# Patient Record
Sex: FEMALE | Race: BLACK OR AFRICAN AMERICAN | Employment: UNEMPLOYED | URBAN - METROPOLITAN AREA
[De-identification: names, ages, dates, MRNs, and addresses within clinical notes are randomized per-mention and may not be internally consistent; named-entity substitution may affect disease eponyms.]

---

## 2016-06-09 LAB
CHOLEST SERPL-MCNC: 186 MG/DL (ref 0–200)
CREAT SERPL-MCNC: 0.77 MG/DL (ref 0.6–1.3)
GFR SERPL CREATININE-BSD FRML MDRD: 81.1 ML/MIN/1.73M2
GLUCOSE SERPL-MCNC: 77 MG/DL (ref 74–106)
HDLC SERPL-MCNC: 63 MG/DL (ref 40–60)
LDLC SERPL CALC-MCNC: 113.2 MG/DL (ref 0–130)
PAP SMEAR - HIM PATIENT REPORTED: NORMAL
POTASSIUM SERPL-SCNC: 4 MMOL/L (ref 3.5–5.1)
TRIGL SERPL-MCNC: 49 MG/DL (ref 0–200)
TSH SERPL-ACNC: 0.79 MIU/ML (ref 0.36–3.74)

## 2017-06-14 ENCOUNTER — TRANSFERRED RECORDS (OUTPATIENT)
Dept: HEALTH INFORMATION MANAGEMENT | Facility: CLINIC | Age: 47
End: 2017-06-14

## 2017-06-14 LAB — TSH SERPL-ACNC: 0.96 MIU/ML (ref 0.36–3.74)

## 2018-11-28 ENCOUNTER — TELEPHONE (OUTPATIENT)
Dept: ENDOCRINOLOGY | Facility: CLINIC | Age: 48
End: 2018-11-28

## 2018-11-28 NOTE — TELEPHONE ENCOUNTER
PREVISIT INFORMATION                                                    Linettedonovan Marsh Leonor scheduled for future visit at [facility] specialty clinics.    Patient is scheduled to see Dr. Leighann Del Rio  on 12/5/18  Reason for visit: Goiter  Referring provider Dr. Atilio Patel  Has patient seen previous specialist? No  Medical Records:  Patient will call [facility] and have records faxed. Patient states she had a biopsy done about 10 years or more ago but is not sure where, possibly [facility].    REVIEW                                                      New patient packet mailed to patient: N/A  Medication reconciliation complete: No - patient will bring list with her.      No current outpatient prescriptions on file.       Allergies: Review of patient's allergies indicates not on file.    PLAN/FOLLOW-UP NEEDED                                                      Previsit review complete.  Patient will see provider at future scheduled appointment.     Patient Reminders Given:  Please, make sure you bring an updated list of your medications.   If you are having a procedure, please, present 15 minutes early.  If you need to cancel or reschedule,please call [phone].    Jazmyn Kelley CMA  Adult Endocrinology  [facility]

## 2018-12-04 ENCOUNTER — TELEPHONE (OUTPATIENT)
Dept: ENDOCRINOLOGY | Facility: CLINIC | Age: 48
End: 2018-12-04

## 2018-12-04 NOTE — TELEPHONE ENCOUNTER
Contacted pt. To let her know that no records have been received yet.  She will contact previous provider and have them faxed directly to the pod.  Will leave encounter open and call pt before the end of the day if records do not arrive.  Tiffany Meneses CMA

## 2018-12-04 NOTE — TELEPHONE ENCOUNTER
[facility] Call Center    Phone Message    May a detailed message be left on voicemail: yes    Reason for Call: Other: Pt asking if her records were rec'd yet from Dr Patel for her appt tomorrow?       Action Taken: Message routed to:  Adult Clinics: Endocrinology p [phone]

## 2018-12-05 ENCOUNTER — OFFICE VISIT (OUTPATIENT)
Dept: ENDOCRINOLOGY | Facility: CLINIC | Age: 48
End: 2018-12-05
Payer: COMMERCIAL

## 2018-12-05 VITALS
WEIGHT: 224.8 LBS | SYSTOLIC BLOOD PRESSURE: 131 MMHG | DIASTOLIC BLOOD PRESSURE: 91 MMHG | OXYGEN SATURATION: 97 % | HEIGHT: 64 IN | HEART RATE: 91 BPM | BODY MASS INDEX: 38.38 KG/M2

## 2018-12-05 DIAGNOSIS — E04.1 RIGHT-SIDED UNINODULAR GOITER: Primary | ICD-10-CM

## 2018-12-05 LAB
T4 FREE SERPL-MCNC: 0.87 NG/DL (ref 0.76–1.46)
TSH SERPL DL<=0.005 MIU/L-ACNC: 0.5 MU/L (ref 0.4–4)

## 2018-12-05 PROCEDURE — 84443 ASSAY THYROID STIM HORMONE: CPT | Performed by: INTERNAL MEDICINE

## 2018-12-05 PROCEDURE — 36415 COLL VENOUS BLD VENIPUNCTURE: CPT | Performed by: INTERNAL MEDICINE

## 2018-12-05 PROCEDURE — 84439 ASSAY OF FREE THYROXINE: CPT | Performed by: INTERNAL MEDICINE

## 2018-12-05 PROCEDURE — 99244 OFF/OP CNSLTJ NEW/EST MOD 40: CPT | Performed by: INTERNAL MEDICINE

## 2018-12-05 NOTE — LETTER
12/5/2018         RE: Marcelo Galvez  [address] [place] [address]        Dear Colleague,    Thank you for referring your patient, Marcelo Galvez, to the [facility]. Please see a copy of my visit note below.      The patient is seen in consultation at the request of Dr. Atilio Patel for evaluation of a thyroid goiter.     Marcelo Galvez is a 48 year old female with a nonsignificant past medical history, who noticed a enlargement after her last delivery, in 2008.  The patient remembers having a right-sided thyroid biopsy done in 2008 (not sure of the place), which was benign.  She thinks she might have seen an endocrinologist after the biopsy, but she is not sure.  Follow-up ultrasounds were done in 2012 and 2017, at [facility].  Unfortunately, the ultrasound images were not available for my review at the time of the visit.  The prior reports documented following findings:  US 2012   RIGHT LOBE: Overall the right lobe measures  4.8 cm X 5.0 cm X 6.9 cm.  The right lobe is diffusely inhomogeneous in echotexture.    LEFT LOBE:  Overall the left lobe measures  1.0 cm X 1.4 cm X 3.9 cm.   No cystic or solid masses seen.  US 6/2017   RIGHT LOBE: Overall the right lobe measures  2.3 cm X 5.5 cm X 9.3 cm. The cephalocaudal dimension appears to be better visualized on the current examination.  Diffuse inhomogeneous echotexture, unchanged.    LEFT LOBE:  Overall the left lobe measures  1.0 cm X 1.2 cm X 4.1 cm.  No cystic or solid masses seen.  The patient denies voice hoarseness, dysphagia, cough, dizziness.  With sudden neck movements, she is aware of the right thyroid enlargement.  She denies a family history of thyroid goiter or cancer.  She has no prior history of radiation exposure.    I reviewed outside lab results:  6/10/16  TSH 0.79  Free T4 0.87  Triglycerides 49  HDL 63    Glucose 77, normal BMP     14/17  TSH 0.96  Free T4 0.85    Past Medical History  "  Breast biopsies   Thyroid goiter    Past Surgical History    x1  [place] teeth surgery    Current Medications  Prescription Medications as of 2018             Multiple Vitamin (MULTI VITAMIN DAILY PO) Take 1 tablet by mouth        Family History   Distant aunt had HTN. Father had pancreatic cancer. Grandmother - breast cancer in her 70s.     Social History  . She has 3 children. She denies smoking, drinking alcohol or using illicit drugs. Occupation: stay at home mom.     Review of Systems   Systemic:             No fatigue; weight stable   Eye:                      Wears cheaters   Mustapha-Laryngeal:     As above     Breast:                  No breast symptoms  Cardiovascular:    No cardiovascular symptoms, no CP or palpitations   Pulmonary:           No pulmonary symptoms, no SOB or cough    Gastrointestinal:   No gastrointestinal symptoms, no diarrhea or constipation   Genitourinary:       No genitourinary symptoms, no increased thirst or urination   Endocrine:            Last MP was in 2018; she has been experiencing hot flashes   Neurological:        Headaches present once every few months, no tremor, intermittent numbness sensation in her fingers - worsen during the last 2 months, no dizziness   Musculoskeletal:  No musculoskeletal symptoms, no muscle or joint pain   Skin:                     No skin symptoms, no dry skin, no hair falling out   Psychological:     No psychological symptoms                 Vital Signs     Previous Weights:    Wt Readings from Last 10 Encounters:   18 102 kg (224 lb 12.8 oz)        BP (!) 131/91 (BP Location: Left arm, Patient Position: Sitting, Cuff Size: Adult Large)  Pulse 91  Ht 1.624 m (5' 3.94\")  Wt 102 kg (224 lb 12.8 oz)  SpO2 97%  BMI 38.66 kg/m2    Physical Exam  General Appearance: General obesity, no distress noted  Eyes:  conjutivae and extra-ocular motions are normal.                                    pupils round and reactive to " light, no lid lag, no stare    HEENT:   oropharynx clear and moist, no JVD, no bruits     Palpable and visible right thyroid lobe enlargement, measuring 9 cm in the longitudinal diameter.  The left thyroid lobe appears normal in size.  Cardiovascular:  regular rhythm, no murmurs, distal pulse palpable, no edema  Respiratory:        chest clear, no rales, no rhonchi   Gastrointestinal:  abdomen soft, non-tender, non-distended, normal bowel sounds,    no organomegaly  Musculoskeletal:  normal tone and strength  Psychological:          affect and judgment normal  Skin:  warm, no lesions  Neurological:  reflexes normal and symmetric, no resting tremor.     Assessment     Right-sided thyroid goiter, nontoxic. She has no risk factors for thyroid cancer.      Clinically, she is euthyroid.  We are going to recheck the thyroid hormone levels today, to confirm the euthyroid status.    Considering the size and the patient's age, I recommended thyroidectomy.  Long-term, she has a high risk of developing compressive symptoms related to the right thyroid enlargement.  There is a 30-40% chance for the patient to develop hypothyroidism, post surgery.  Counseled on surgery, complications associated with surgery.    Plan:  Obtain outside ultrasound images from 2017.  Depending on the ultrasound appearance of the thyroid gland, we might consider pursuing a thyroid biopsy.  Schedule a neck CT, to better evaluate the relationship between the thyroid gland and surrounding structures  Check TFTs today  Surgery consult     Orders Placed This Encounter   Procedures     CT Soft Tissue Neck w/o Contrast     TSH     T4 free     Surgery General IP Consult                             Again, thank you for allowing me to participate in the care of your patient.        Sincerely,        Leighann Del Rio MD

## 2018-12-05 NOTE — LETTER
Patient:  Marcelo Galvez  :   1970  MRN:     [id number]        Ms.Mawunge Maximo Galvez  [address]  [place] [address]        2018    Dear ,    These are the most recent lab results. The thyroid hormone levels are normal.     Resulted Orders   TSH   Result Value Ref Range    TSH 0.50 0.40 - 4.00 mU/L   T4 free   Result Value Ref Range    T4 Free 0.87 0.76 - 1.46 ng/dL       It was a pleasure to see you at your recent visit. Please let me know if you have any questions or concerns.     Sincerely,    Leighann Del Rio MD

## 2018-12-05 NOTE — PROGRESS NOTES
The patient is seen in consultation at the request of Dr. Atilio Patel for evaluation of a thyroid goiter.     Marcelo Galvez is a 48 year old female with a nonsignificant past medical history, who noticed a enlargement after her last delivery, in .  The patient remembers having a right-sided thyroid biopsy done in  (not sure of the place), which was benign.  She thinks she might have seen an endocrinologist after the biopsy, but she is not sure.  Follow-up ultrasounds were done in  and , at [facility].  Unfortunately, the ultrasound images were not available for my review at the time of the visit.  The prior reports documented following findings:  US    RIGHT LOBE: Overall the right lobe measures  4.8 cm X 5.0 cm X 6.9 cm.  The right lobe is diffusely inhomogeneous in echotexture.    LEFT LOBE:  Overall the left lobe measures  1.0 cm X 1.4 cm X 3.9 cm.   No cystic or solid masses seen.  US 2017   RIGHT LOBE: Overall the right lobe measures  2.3 cm X 5.5 cm X 9.3 cm. The cephalocaudal dimension appears to be better visualized on the current examination.  Diffuse inhomogeneous echotexture, unchanged.    LEFT LOBE:  Overall the left lobe measures  1.0 cm X 1.2 cm X 4.1 cm.  No cystic or solid masses seen.  The patient denies voice hoarseness, dysphagia, cough, dizziness.  With sudden neck movements, she is aware of the right thyroid enlargement.  She denies a family history of thyroid goiter or cancer.  She has no prior history of radiation exposure.    I reviewed outside lab results:  6/10/16  TSH 0.79  Free T4 0.87  Triglycerides 49  HDL 63    Glucose 77, normal BMP       TSH 0.96  Free T4 0.85    Past Medical History   Breast biopsies   Thyroid goiter    Past Surgical History    x1  [place] teeth surgery    Current Medications  Prescription Medications as of 2018             Multiple Vitamin (MULTI VITAMIN DAILY PO) Take 1 tablet by mouth        Family  "History   Distant aunt had HTN. Father had pancreatic cancer. Grandmother - breast cancer in her 70s.     Social History  . She has 3 children. She denies smoking, drinking alcohol or using illicit drugs. Occupation: stay at home mom.     Review of Systems   Systemic:             No fatigue; weight stable   Eye:                      Wears cheaters   Mustapha-Laryngeal:     As above     Breast:                  No breast symptoms  Cardiovascular:    No cardiovascular symptoms, no CP or palpitations   Pulmonary:           No pulmonary symptoms, no SOB or cough    Gastrointestinal:   No gastrointestinal symptoms, no diarrhea or constipation   Genitourinary:       No genitourinary symptoms, no increased thirst or urination   Endocrine:            Last MP was in 1/2018; she has been experiencing hot flashes   Neurological:        Headaches present once every few months, no tremor, intermittent numbness sensation in her fingers - worsen during the last 2 months, no dizziness   Musculoskeletal:  No musculoskeletal symptoms, no muscle or joint pain   Skin:                     No skin symptoms, no dry skin, no hair falling out   Psychological:     No psychological symptoms                 Vital Signs     Previous Weights:    Wt Readings from Last 10 Encounters:   12/05/18 102 kg (224 lb 12.8 oz)        BP (!) 131/91 (BP Location: Left arm, Patient Position: Sitting, Cuff Size: Adult Large)  Pulse 91  Ht 1.624 m (5' 3.94\")  Wt 102 kg (224 lb 12.8 oz)  SpO2 97%  BMI 38.66 kg/m2    Physical Exam  General Appearance: General obesity, no distress noted  Eyes:  conjutivae and extra-ocular motions are normal.                                    pupils round and reactive to light, no lid lag, no stare    HEENT:   oropharynx clear and moist, no JVD, no bruits     Palpable and visible right thyroid lobe enlargement, measuring 9 cm in the longitudinal diameter.  The left thyroid lobe appears normal in size.  Cardiovascular:  " regular rhythm, no murmurs, distal pulse palpable, no edema  Respiratory:        chest clear, no rales, no rhonchi   Gastrointestinal:  abdomen soft, non-tender, non-distended, normal bowel sounds,    no organomegaly  Musculoskeletal:  normal tone and strength  Psychological:          affect and judgment normal  Skin:  warm, no lesions  Neurological:  reflexes normal and symmetric, no resting tremor.     Assessment     Right-sided thyroid goiter, nontoxic. She has no risk factors for thyroid cancer.      Clinically, she is euthyroid.  We are going to recheck the thyroid hormone levels today, to confirm the euthyroid status.    Considering the size and the patient's age, I recommended thyroidectomy.  Long-term, she has a high risk of developing compressive symptoms related to the right thyroid enlargement.  There is a 30-40% chance for the patient to develop hypothyroidism, post surgery.  Counseled on surgery, complications associated with surgery.    Plan:  Obtain outside ultrasound images from 2017.  Depending on the ultrasound appearance of the thyroid gland, we might consider pursuing a thyroid biopsy.  Schedule a neck CT, to better evaluate the relationship between the thyroid gland and surrounding structures  Check TFTs today  Surgery consult     Orders Placed This Encounter   Procedures     CT Soft Tissue Neck w/o Contrast     TSH     T4 free     Surgery General IP Consult

## 2018-12-05 NOTE — MR AVS SNAPSHOT
After Visit Summary   12/5/2018    Marcelo Galvez    MRN: [id number]           Patient Information     Date Of Birth          1970        Visit Information        Provider Department      12/5/2018 9:00 AM Leighann Del Rio MD [facility]        Today's Diagnoses     Right-sided uninodular goiter    -  1       Follow-ups after your visit        Additional Services     Surgery General IP Consult                 Follow-up notes from your care team     Return in about 6 months (around 6/5/2019) for labs today, consult schedule, CT to be scheduled.      Your next 10 appointments already scheduled     Dec 07, 2018 10:30 AM CST   CT SOFT TISSUE NECK W/O CONTRAST with [facility]   [facility]    [address] [place] [id number] [phone]           How do I prepare for my exam? (Food and drink instructions) No Food and Drink Restrictions.  How do I prepare for my exam? (Other instructions) You do not need to do anything special to prepare for this exam. For a sinus scan: Use your nose spray (nasal decongestant spray) as directed.  What should I wear: Please wear loose clothing, such as a sweat suit or jogging clothes. Avoid snaps, zippers and other metal. We may ask you to undress and put on a hospital gown.  How long does the exam take: Most scans take less than 20 minutes.  What should I bring: Please bring any scans or X-rays taken at other hospitals, if similar tests were done. Also bring a list of your medicines, including vitamins, minerals and over-the-counter drugs. It is safest to leave personal items at home.  Do I need a : No  is needed.  What do I need to tell my doctor? Be sure to tell your doctor: * If you have any allergies. * If there s any chance you are pregnant. * If you are breastfeeding.  What should I do after the exam: No restrictions, You may resume normal activities.  What is  this test: A CT (computed tomography) scan is a series of pictures that allows us to look inside your body. The scanner creates images of the body in cross sections, much like slices of bread. This helps us see any problems more clearly.  Who should I call with questions: If you have any questions, please call the Imaging Department where you will have your exam. Directions, parking instructions, and other information is available on our website, [email or web address].            Dec 21, 2018 12:00 PM CST   New Visit with Frances [facility]adeo MD   [facility]    [address] [place] [address]   [phone]            Jul 23, 2019 11:30 AM CDT   Return Visit with Leighann Del Rio MD   [facility]    [address] [place] [id number] [phone]              Future tests that were ordered for you today     Open Future Orders        Priority Expected Expires Ordered    CT Soft Tissue Neck w/o Contrast Routine  12/5/2019 12/5/2018    T4 free Routine 12/5/2018 12/5/2019 12/5/2018    TSH Routine 12/5/2018 12/5/2019 12/5/2018            Who to contact     If you have questions or need follow up information about today's clinic visit or your schedule please contact [facility] directly at [phone].  Normal or non-critical lab and imaging results will be communicated to you by MyChart, letter or phone within 4 business days after the clinic has received the results. If you do not hear from us within 7 days, please contact the clinic through MyChart or phone. If you have a critical or abnormal lab result, we will notify you by phone as soon as possible.  Submit refill requests through [facility] or call your pharmacy and they will forward the refill request to us. Please allow 3 business days for your refill to be completed.          Additional Information About Your Visit    "     MyChart Information     whistleBox is an electronic gateway that provides easy, online access to your medical records. With whistleBox, you can request a clinic appointment, read your test results, renew a prescription or communicate with your care team.     To sign up for [employer]Box visit the website at [email or web address]   You will be asked to enter the access code listed below, as well as some personal information. Please follow the directions to create your username and password.     Your access code is: [email or web address]  Expires: 3/5/2019  9:58 AM     Your access code will  in 90 days. If you need help or a new code, please contact your [facility] or call [phone] for assistance.        Care EveryWhere ID     This is your Care EveryWhere ID. This could be used by other organizations to access your [facility] medical records  [id number]        Your Vitals Were     Pulse Height Pulse Oximetry BMI (Body Mass Index)          91 1.624 m (5' 3.94\") 97% 38.66 kg/m2         Blood Pressure from Last 3 Encounters:   18 (!) 131/91    Weight from Last 3 Encounters:   18 102 kg (224 lb 12.8 oz)              We Performed the Following     Surgery General IP Consult        Primary Care Provider Office Phone # Fax #    Atilio Patel PA-C [phone]       [facility] [address] [facility] [address]  [facility] [address]        Equal Access to Services     BEV MOELLER : Hadii aad ku hadasho Soomaali, waaxda luqadaha, qaybta kaalmada adeegyada, henok love . So [facility] [phone].    ATENCIÓN: Si habla español, tiene a mays disposición servicios gratuitos de asistencia lingüística. Llame al [phone].    We comply with applicable federal civil rights laws and [place] laws. We do not discriminate on the basis of race, color, national origin, age, disability, sex, sexual orientation, or gender identity.            Thank you!     Thank " you for choosing [facility]  for your care. Our goal is always to provide you with excellent care. Hearing back from our patients is one way we can continue to improve our services. Please take a few minutes to complete the written survey that you may receive in the mail after your visit with us. Thank you!             Your Updated Medication List - Protect others around you: Learn how to safely use, store and throw away your medicines at www.disposemymeds.org.          This list is accurate as of 12/5/18  9:58 AM.  Always use your most recent med list.                   Brand Name Dispense Instructions for use Diagnosis    MULTI VITAMIN DAILY PO      Take 1 tablet by mouth

## 2018-12-21 ENCOUNTER — ANCILLARY PROCEDURE (OUTPATIENT)
Dept: CT IMAGING | Facility: CLINIC | Age: 48
End: 2018-12-21
Attending: INTERNAL MEDICINE
Payer: COMMERCIAL

## 2018-12-21 DIAGNOSIS — E04.1 RIGHT-SIDED UNINODULAR GOITER: ICD-10-CM

## 2018-12-21 PROCEDURE — 70491 CT SOFT TISSUE NECK W/DYE: CPT | Performed by: RADIOLOGY

## 2018-12-21 RX ORDER — IOPAMIDOL 755 MG/ML
100 INJECTION, SOLUTION INTRAVASCULAR ONCE
Status: COMPLETED | OUTPATIENT
Start: 2018-12-21 | End: 2018-12-21

## 2018-12-21 RX ADMIN — IOPAMIDOL 100 ML: 755 INJECTION, SOLUTION INTRAVASCULAR at 11:32

## 2018-12-22 NOTE — RESULT ENCOUNTER NOTE
On the neck CT, the right thyroid mass appears larger compared with the prior ultrasound images from 2017.  It impinges on a few anatomical structures, including the airway, vessels.  My recommendation remains the same: To have it removed surgically.  Although the mass appears to be benign on imaging, I would recommend pursuing a biopsy of the mass prior to surgery.  In the event malignancy is discovered, you are going to benefit from having the left side of the thyroid removed, too.  If the biopsy is benign, just having the right side of the thyroid removed should suffice.  I know you are scheduled to see Dr. Tadeo soon.  Please discuss with her the option of pursuing a biopsy prior to surgery.

## 2018-12-24 ENCOUNTER — TELEPHONE (OUTPATIENT)
Dept: ENDOCRINOLOGY | Facility: CLINIC | Age: 48
End: 2018-12-24

## 2018-12-24 NOTE — TELEPHONE ENCOUNTER
Received result note from Dr. Del Rio as follows:  On the neck CT, the right thyroid mass appears larger compared with the prior ultrasound images from 2017.  It impinges on a few anatomical structures, including the airway, vessels.  My recommendation remains the same: To have it removed surgically.  Although the mass appears to be benign on imaging, I would recommend pursuing a biopsy of the mass prior to surgery.  In the event malignancy is discovered, you are going to benefit from having the left side of the thyroid removed, too.  If the biopsy is benign, just having the right side of the thyroid removed should suffice.  I know you are scheduled to see Dr. Tadeo soon. Please discuss with her the option of pursuing a biopsy prior to surgery.      Patient is scheduled to see Dr. Tadeo on 12/26/18. Contacted patient to review. Advised patient of Dr. Del Rio's recommendations. Patient verbalizes understanding and agrees to plan. Patient will discuss Dr. Tadeo on 12/26/18.     Dee Gaitan RN  Endocrine Care Coordinator  [facility]

## 2018-12-26 ENCOUNTER — OFFICE VISIT (OUTPATIENT)
Dept: SURGERY | Facility: CLINIC | Age: 48
End: 2018-12-26
Payer: COMMERCIAL

## 2018-12-26 VITALS
TEMPERATURE: 97.9 F | BODY MASS INDEX: 37.84 KG/M2 | OXYGEN SATURATION: 97 % | SYSTOLIC BLOOD PRESSURE: 129 MMHG | WEIGHT: 227.1 LBS | DIASTOLIC BLOOD PRESSURE: 86 MMHG | HEART RATE: 88 BPM | RESPIRATION RATE: 18 BRPM | HEIGHT: 65 IN

## 2018-12-26 DIAGNOSIS — E04.9 SUBSTERNAL THYROID GOITER: Primary | ICD-10-CM

## 2018-12-26 PROCEDURE — 99203 OFFICE O/P NEW LOW 30 MIN: CPT | Performed by: SURGERY

## 2018-12-26 ASSESSMENT — MIFFLIN-ST. JEOR: SCORE: 1662.03

## 2018-12-26 ASSESSMENT — PAIN SCALES - GENERAL: PAINLEVEL: SEVERE PAIN (7)

## 2018-12-26 NOTE — LETTER
12/26/2018         RE: Marcelo Galvez  [address] [place] [address]        Dear Colleague,    Thank you for referring your patient, Marcelo Galvez, to the [facility]. Please see a copy of my visit note below.    I spent > 30 minutes in the care and consultation of this patient for right thyroid nodule.       This is a 48 year old female who noticed a enlargement after her last delivery, in 2008.   A biopsy of this was done and noted to be benign at an outside hospital. She thinks she might have seen an endocrinologist after the biopsy, but she is not sure.  Follow-up ultrasounds were done in 2012 and 2017, at [facility].     US from 2012 reveals a 6.9 cm thyroid mass, 2017 nodure increased in size to 9.3 cm and recent CT done 12/21/2108 shows a 8.3 x 5.8. 5.1 cm right thyroid mass.    The patient denies voice hoarseness, dysphagia, cough, dizziness.  With sudden neck movements, she is aware of the right thyroid enlargement.  She denies a family history of thyroid goiter or cancer.  She has no prior history of radiation exposure.      PMH/PSH and medications reviewed in EMR    PE: Very large right thyroid mass extending almost 10 cm in size with tracheal deviation. No airway compromise. Left lobe barely palpable    TFT's are WNL    Asses:  Very large thyroid goiter -RIGHT    Plan:  I recommend right thyroid lobectomy. Patient will likely require a drain postoperatively and admitted to hospital due to size of mass. Discussed risks including but not limited to bleeding, infection, injury to the recurrent laryngeal nerve, loss of airway.         Again, thank you for allowing me to participate in the care of your patient.        Sincerely,        Frances Tadeo MD

## 2018-12-26 NOTE — PATIENT INSTRUCTIONS
Surgery Instructions    Always follow your surgeon s instructions. If you don t, your surgery could be cancelled. Please use the following checklist.  Your surgery is on: The surgery scheduler will contact you within 1 week of your consult with the surgeon. If you do not hear from them, please call the clinic or RN Care Coordinator for your provider.    Time: Prearrival times can vary depending on location/type of surgery.  [place] - 2 hour pre-arrival  [facility] [place] [place] 2 hour pre-arrival  [place] - 1 hour pre-arrival    Note:  These times may change. A nurse will call you before surgery to confirm. If you have not received a call or if you have more questions, please call us on the working day before your surgery:  ? [facility]: [phone] (9am to 5pm)  Prior to surgery  ? Have a pre-op physical exam with your Primary Doctor within 30 days of surgery  - Ask your doctor to send all of your results to the surgery center/hospital before surgery. Your doctor also may ask you to bring the results with you on the day of surgery.  - Tell your doctor if:  - You are allergic to latex or rubber (latex and rubber gloves are often used in medical care).  - You are taking any medicines (including aspirin), vitamins, or herbal products. You may need to stop taking some medicines before surgery.  - You have any medical problems (allergies, diabetes, or heart disease, for example).  - You have a pacemaker or an AICD (automatic implanted cardiac defibrillator). If you do, please bring the ID card with you on the day of surgery.  - People who smoke have a higher risk of infection after surgery. Ask your doctor how you can quit smoking.  - If you Primary Doctor is not within the [employer] system, you will need to have your pre-op physical faxed to us to be scanned into your chart.  - [facility] [place] [place] ([facility]): [phone]  ? Call your insurance company. Ask if you need pre-approval for your surgery. If you do not have  insurance, please let us know. If you wish to speak to the , please alert the clinic staff so this can be arranged.  ? Arrange for someone to drive you home after surgery.  will need to be a responsible adult (18 years or older) that will provide transportation to and from surgery and stay in the waiting room during your surgery. You may not drive yourself or take public transportation to and from surgery.  ? Arrange for someone to stay with you for 24 hours after you go home. This person must be a responsible adult (18 years or older).  ? Call your surgeon or their nurse if there is any change in your health (cold, flu, infections, hospitalizations).  ? Do not smoke, drink alcohol, or take over-the-counter medicine for 24 hours before and after surgery.  ? If you take prescribed drugs, you may need to stop them until after the surgery.  Discuss what medications to take or not take prior to surgery with your Primary Doctor at your pre-op physical. Avoid over-the-counter blood-thinning medications such as Aspirin, Ibuprofen, vitamin E, or fish oil 7 days prior to surgery (unless otherwise directed by your Primary Doctor). Tylenol is a good alternative for mild pain relief prior to surgery.  ? Eating and drinking guidelines prior to surgery:  - Stop all solid food consumption 8 hours prior to surgery  - You may drink clear liquids up to 2 hours prior to surgery (water, fruit juices without pulp, jello, tea/coffee without creamer, sports drinks, clear-fat free broth (bouillon or consomme), popsicles (without milk, bits of fruit, or seeds/nuts)  ? Follow instructions given for showering or bathing before surgery.    - Use 8 ounces of antiseptic surgical soap, like:  - Hibiclens, Scrub Care, or Exidine  - You can find it at your local pharmacy, clinic, or retail store. If you have trouble, ask your pharmacist to help you find the right substitute.  - Please wash with one of the above soaps twice  before coming to the hospital for your surgery. This will decrease bacteria (germs) on your skin. It will also help reduce your chance of infection after surgery.  - Items you will need for showering:  - 4 newly washed washcloths  - 2 newly washed towels  - 8 ounces of one of the above soaps  - Following these instructions:  - The evening before surgery: Shower or bathe as you normally would, using your regular soap and a clean washcloth. Give special attention to places where your incision (surgical cut) or catheters will be. This includes your groin area. Rinse well. You may wash your hair with your regular shampoo. Next, wash your body with 4 ounces of the antiseptic soap. Use a clean, damp washcloth and gently clean your body (from the chin down). If your surgery involves your head, use the special soap on your head and scalp. Rinse well and dry off using a newly washed towel.  - The morning of surgery: Repeat the same process as the evening shower.  - Other suggestions:    Do not shave within 12 inches of your incision (surgical cut) area for at least 3 days before surgery. Shaving can make small cuts in the skin. This puts you at higher risk of infection.    Wear freshly washed pajamas or clothing after your evening shower.    Wear freshly washed clothes the day of surgery.    Wash and change your bed sheets the day before surgery to have clean bed sheets after your shower and when you get home from surgery.    If you have trouble washing all areas, make sure someone helps you.    Don't use any deodorant, lotion or powder after your shower.    Women who are menstruating should wear a fresh sanitary pad to the hospital.  ? Do not wear or add deodorant, cologne, lotion, makeup, nail polish or jewelry to surgery. If you wear fake nails, please remove at least one nail before coming to surgery (an oxygen monitor needs to be placed on your finger during surgery).  ? Bring these items to the surgery  center/hospital:  - Insurance card  - Money for parking and co-pays, if needed  - A list of all the medicines you take. Include vitamins, minerals, herbs, and over-the-counter drugs.  Note any drug allergies.  - A copy of your advance health care directive, if you have one. This tells us what treatment you would want--and who would make health care decisions--if you could no longer speak for yourself. You may request this form in advance or download it from [email or web address].  - A case for glasses, contact lenses, hearing aids, or dentures.  - Your inhaler or CPAP machine, if you use these at home.  ? Leave extra cash, jewelry, and other valuables at home.  When you arrive  When you get to the surgery center/hospital, you will:  ? Check in. If you are under age 18, you must be with a parent or legal guardian.  ? Sign consent forms, if you haven t already. These forms state that you know the risks and benefits of surgery. When you sign the forms, you give us permission to do the surgery. Do not sign them unless you understand what will happen during and after your surgery. If you have any questions about your surgery, ask to speak with your doctor before you sign the forms. If you don t understand the answers, ask again.  ? Receive a copy of the Patient s Bill of Rights. If you do not receive a copy, please ask for one.  ? Change into hospital clothes. Your belongings will be placed in a bag. We will return them to you after surgery.  ? Meet with the anesthesia provider. He or she will tell you what kind of anesthesia (medicine) will be used to keep you comfortable during surgery.  Remember: it s okay to remind doctors and nurses to wash their hands before touching you.  In most cases, your surgeon will use a marker to write his or her initials on the surgery site. This ensures that the exact site is operated on.  For safety reasons, we will ask you the same questions many times. For example, we may ask your  name and birth date over and over again.  Friends and family can stay with you until it s time for surgery. While you re in surgery, they will be in the waiting area. Please note that cell phones are not allowed in some patient care areas.  If you have questions about what will happen in the operating room, talk to your care team.  After surgery  We will move you to a recovery room, where we will watch you closely. If you have any pain or discomfort, tell your nurse. He or she will try to make you comfortable.  If you are staying overnight, we will move you to your hospital room after you are awake.  If you are going home, we will move you to another room. Friends and family may be able to join you. The length of time you spend in recovery depend on the type of medicine you received, your medical condition, the type of surgery you had, or your response to the anesthesia given during your procedure.  When you are discharged from the recovery room, the nurses will review instructions with you and your caregiver.  ? Please wash your hands every time you touch the wound or change bandages or dressings.  ? Do not submerge the wound in water.  You may not use a bathtub or hot tub until the wound is closed. The wait time frame is generally 2-3 weeks, but any open area can be a source of incoming bacteria, so it is better to be on the safe side and avoid water submersion until your wound is fully healed.  ? You may take a shower 24 hours after surgery. Double check with your surgeon if it is OK for water to run over the wound, whether it has been sutured, stapled, glued, or is open. You may gently wash the wound using the antiseptic soap provided for your pre-surgery showering (do not use a washcloth). Any mild soap will work as well.  ? Many surgical wounds will have small white strips of tape on them called steri-strips.  Do not remove these. The edges will curl and fall off within 7-10 days with normal showering.  ? If  you are going home with sutures (stitches) or staples, you must return to the clinic to have them taken out, usually within 1-2 weeks. Some stitches are dissolvable and do not require removal. Make sure to clarify with your surgeon or surgery nurse reviewing discharge paperwork what kind of sutures you have.  ? Signs and symptoms of infection include:  - Fever, temperature over 101.5   F  - Redness  - Swelling  - Increased pain  - Green or yellow drainage which may or may not have a foul odor  Dealing with pain  A nurse will check your comfort level often during your stay. He or she will work with you to manage your pain.  Remember:  ? All pain is real. There are many ways to control pain. We can help you decide what works best for you.  ? Ask for pain medicine when you need it. Don t try to  tough it out --this can make you feel worse. Always take your medicine as ordered.  ? Medicine doesn t work the same for everyone. If your medicine isn t working, tell your nurse. There may be other medicines or treatments we can try.  Going home  We will let you know when you re ready to leave the surgery center or hospital. Before you leave, we will tell you how to care for yourself at home and prevent infections. If you do not understand something, please say so. We will answer any questions you have. We will then help you get ready to leave.  Remember, you must have a responsible adult (18 years or older) to stay with you 24 hours after you leave the hospital.  Take it easy when you get home. You will need some time to recover--you may be more tired than you realize at first. Rest and relax for at least the first 24 hours at home. You ll feel better and heal faster if you take good care of yourself.  Follow the discharge instructions that are given to you when you leave the surgery center or hospital  Please call the clinic if you experience any problems during regular clinic hours (Monday-Friday 8:00am-5:00pm).  If you  experience problems during non-clinic hours, please call the [facility] on-call line at [phone] and ask the  to page the on-call Provider for your specialty. The on-call Provider will call you back and can triage your symptoms and further advise. If you are having an emergency, always call 911 or seek immediate evaluation at the Emergency Room.  Locations  [facility], [address] [address]  [phone] (patient registration)  [phone] (main line)  [email or web address]

## 2018-12-26 NOTE — NURSING NOTE
"Marcelo Galvez's goals for this visit include:   Chief Complaint   Patient presents with     Consult      Consult Goiter - per Dr. Valdez       She requests these members of her care team be copied on today's visit information: Yes    PCP: Atilio Patel    Referring Provider:  No referring provider defined for this encounter.    /86 (BP Location: Left arm, Patient Position: Sitting, Cuff Size: Adult Large)   Pulse 88   Temp 97.9  F (36.6  C) (Oral)   Resp 18   Ht 1.645 m (5' 4.75\")   Wt 103 kg (227 lb 1.6 oz)   SpO2 97%   BMI 38.08 kg/m      Do you need any medication refills at today's visit? No    Kevin Lau CMA ([facility])      "

## 2019-01-27 NOTE — PROGRESS NOTES
I spent > 30 minutes in the care and consultation of this patient for right thyroid nodule.       This is a 48 year old female who noticed a enlargement after her last delivery, in 2008.  A biopsy of this was done and noted to be benign at an outside hospital. She thinks she might have seen an endocrinologist after the biopsy, but she is not sure.  Follow-up ultrasounds were done in 2012 and 2017, at [facility].     US from 2012 reveals a 6.9 cm thyroid mass, 2017 nodure increased in size to 9.3 cm and recent CT done 12/21/2108 shows a 8.3 x 5.8. 5.1 cm right thyroid mass.    The patient denies voice hoarseness, dysphagia, cough, dizziness.  With sudden neck movements, she is aware of the right thyroid enlargement.  She denies a family history of thyroid goiter or cancer.  She has no prior history of radiation exposure.      PMH/PSH and medications reviewed in EMR    PE: Very large right thyroid mass extending almost 10 cm in size with tracheal deviation. No airway compromise. Left lobe barely palpable    TFT's are WNL    Asses:  Very large thyroid goiter -RIGHT    Plan:  I recommend right thyroid lobectomy. Patient will likely require a drain postoperatively and admitted to hospital due to size of mass. Discussed risks including but not limited to bleeding, infection, injury to the recurrent laryngeal nerve, loss of airway.

## 2019-03-11 ENCOUNTER — DOCUMENTATION ONLY (OUTPATIENT)
Dept: SURGERY | Facility: CLINIC | Age: 49
End: 2019-03-11

## 2019-03-11 NOTE — PROGRESS NOTES
Call received from Amanda in the [facility] (Pre-op anthesia nurses) stating that patient wished to reschedule her surgery with Dr Tadeo    I called patient and the following are the changes that we did:    OLD Surgery date: 3/13/19    NEW surgery date 5/22/19      Post op date change:    Old: 4/5/19  New: 6/7/19      Surgery scheduling at [facility] contacted and surgery date was rescheduled    Jeniffer Murillo   [facility]-Op Coordinator  [phone]

## 2019-05-21 ENCOUNTER — ANESTHESIA EVENT (OUTPATIENT)
Dept: SURGERY | Facility: CLINIC | Age: 49
End: 2019-05-21
Payer: COMMERCIAL

## 2019-05-22 ENCOUNTER — ANESTHESIA (OUTPATIENT)
Dept: SURGERY | Facility: CLINIC | Age: 49
End: 2019-05-22
Payer: COMMERCIAL

## 2019-05-22 ENCOUNTER — HOSPITAL ENCOUNTER (OUTPATIENT)
Facility: CLINIC | Age: 49
Setting detail: OBSERVATION
Discharge: HOME | End: 2019-05-24
Attending: SURGERY | Admitting: SURGERY
Payer: COMMERCIAL

## 2019-05-22 DIAGNOSIS — E04.1 THYROID NODULE: Primary | ICD-10-CM

## 2019-05-22 LAB
GLUCOSE BLDC GLUCOMTR-MCNC: 90 MG/DL (ref 70–99)
HCG UR QL: NEGATIVE
HGB BLD-MCNC: 12.6 G/DL (ref 11.7–15.7)

## 2019-05-22 PROCEDURE — 40000170 ZZH STATISTIC PRE-PROCEDURE ASSESSMENT II: Performed by: SURGERY

## 2019-05-22 PROCEDURE — 25000125 ZZHC RX 250: Performed by: NURSE ANESTHETIST, CERTIFIED REGISTERED

## 2019-05-22 PROCEDURE — 12000001 ZZH R&B MED SURG/OB UMMC

## 2019-05-22 PROCEDURE — 25000128 H RX IP 250 OP 636: Performed by: STUDENT IN AN ORGANIZED HEALTH CARE EDUCATION/TRAINING PROGRAM

## 2019-05-22 PROCEDURE — 37000009 ZZH ANESTHESIA TECHNICAL FEE, EACH ADDTL 15 MIN: Performed by: SURGERY

## 2019-05-22 PROCEDURE — 36000062 ZZH SURGERY LEVEL 4 1ST 30 MIN - UMMC: Performed by: SURGERY

## 2019-05-22 PROCEDURE — 25800030 ZZH RX IP 258 OP 636: Performed by: NURSE ANESTHETIST, CERTIFIED REGISTERED

## 2019-05-22 PROCEDURE — 25000128 H RX IP 250 OP 636: Performed by: NURSE ANESTHETIST, CERTIFIED REGISTERED

## 2019-05-22 PROCEDURE — 36000064 ZZH SURGERY LEVEL 4 EA 15 ADDTL MIN - UMMC: Performed by: SURGERY

## 2019-05-22 PROCEDURE — 00000146 ZZHCL STATISTIC GLUCOSE BY METER IP

## 2019-05-22 PROCEDURE — 37000008 ZZH ANESTHESIA TECHNICAL FEE, 1ST 30 MIN: Performed by: SURGERY

## 2019-05-22 PROCEDURE — 36415 COLL VENOUS BLD VENIPUNCTURE: CPT | Performed by: ANESTHESIOLOGY

## 2019-05-22 PROCEDURE — 81025 URINE PREGNANCY TEST: CPT | Performed by: ANESTHESIOLOGY

## 2019-05-22 PROCEDURE — 85018 HEMOGLOBIN: CPT | Performed by: ANESTHESIOLOGY

## 2019-05-22 PROCEDURE — 71000014 ZZH RECOVERY PHASE 1 LEVEL 2 FIRST HR: Performed by: SURGERY

## 2019-05-22 RX ORDER — NALOXONE HYDROCHLORIDE 0.4 MG/ML
.1-.4 INJECTION, SOLUTION INTRAMUSCULAR; INTRAVENOUS; SUBCUTANEOUS
Status: DISCONTINUED | OUTPATIENT
Start: 2019-05-22 | End: 2019-05-24 | Stop reason: HOSPADM

## 2019-05-22 RX ORDER — FENTANYL CITRATE 50 UG/ML
INJECTION, SOLUTION INTRAMUSCULAR; INTRAVENOUS PRN
Status: DISCONTINUED | OUTPATIENT
Start: 2019-05-22 | End: 2019-05-22

## 2019-05-22 RX ORDER — GLYCOPYRROLATE 0.2 MG/ML
INJECTION, SOLUTION INTRAMUSCULAR; INTRAVENOUS PRN
Status: DISCONTINUED | OUTPATIENT
Start: 2019-05-22 | End: 2019-05-22

## 2019-05-22 RX ORDER — ACETAMINOPHEN 325 MG/1
650 TABLET ORAL EVERY 4 HOURS PRN
Status: DISCONTINUED | OUTPATIENT
Start: 2019-05-22 | End: 2019-05-22

## 2019-05-22 RX ORDER — SODIUM CHLORIDE, SODIUM LACTATE, POTASSIUM CHLORIDE, CALCIUM CHLORIDE 600; 310; 30; 20 MG/100ML; MG/100ML; MG/100ML; MG/100ML
INJECTION, SOLUTION INTRAVENOUS CONTINUOUS
Status: DISCONTINUED | OUTPATIENT
Start: 2019-05-22 | End: 2019-05-22 | Stop reason: HOSPADM

## 2019-05-22 RX ORDER — PROPOFOL 10 MG/ML
INJECTION, EMULSION INTRAVENOUS PRN
Status: DISCONTINUED | OUTPATIENT
Start: 2019-05-22 | End: 2019-05-22

## 2019-05-22 RX ORDER — HYDROMORPHONE HYDROCHLORIDE 1 MG/ML
.3-.5 INJECTION, SOLUTION INTRAMUSCULAR; INTRAVENOUS; SUBCUTANEOUS EVERY 10 MIN PRN
Status: DISCONTINUED | OUTPATIENT
Start: 2019-05-22 | End: 2019-05-22 | Stop reason: HOSPADM

## 2019-05-22 RX ORDER — ONDANSETRON 2 MG/ML
4 INJECTION INTRAMUSCULAR; INTRAVENOUS EVERY 30 MIN PRN
Status: DISCONTINUED | OUTPATIENT
Start: 2019-05-22 | End: 2019-05-22 | Stop reason: HOSPADM

## 2019-05-22 RX ORDER — DEXAMETHASONE SODIUM PHOSPHATE 4 MG/ML
INJECTION, SOLUTION INTRA-ARTICULAR; INTRALESIONAL; INTRAMUSCULAR; INTRAVENOUS; SOFT TISSUE PRN
Status: DISCONTINUED | OUTPATIENT
Start: 2019-05-22 | End: 2019-05-22

## 2019-05-22 RX ORDER — FENTANYL CITRATE 50 UG/ML
25-50 INJECTION, SOLUTION INTRAMUSCULAR; INTRAVENOUS
Status: DISCONTINUED | OUTPATIENT
Start: 2019-05-22 | End: 2019-05-22 | Stop reason: HOSPADM

## 2019-05-22 RX ORDER — ACETAMINOPHEN 325 MG/1
975 TABLET ORAL ONCE
Status: DISCONTINUED | OUTPATIENT
Start: 2019-05-22 | End: 2019-05-22 | Stop reason: HOSPADM

## 2019-05-22 RX ORDER — LIDOCAINE 40 MG/G
CREAM TOPICAL
Status: DISCONTINUED | OUTPATIENT
Start: 2019-05-22 | End: 2019-05-24 | Stop reason: HOSPADM

## 2019-05-22 RX ORDER — NALOXONE HYDROCHLORIDE 0.4 MG/ML
.1-.4 INJECTION, SOLUTION INTRAMUSCULAR; INTRAVENOUS; SUBCUTANEOUS
Status: DISCONTINUED | OUTPATIENT
Start: 2019-05-22 | End: 2019-05-22 | Stop reason: HOSPADM

## 2019-05-22 RX ORDER — OXYCODONE HCL 5 MG/5 ML
5 SOLUTION, ORAL ORAL EVERY 4 HOURS PRN
Status: DISCONTINUED | OUTPATIENT
Start: 2019-05-22 | End: 2019-05-24 | Stop reason: HOSPADM

## 2019-05-22 RX ORDER — DEXAMETHASONE SODIUM PHOSPHATE 4 MG/ML
6 INJECTION, SOLUTION INTRA-ARTICULAR; INTRALESIONAL; INTRAMUSCULAR; INTRAVENOUS; SOFT TISSUE EVERY 6 HOURS
Status: DISCONTINUED | OUTPATIENT
Start: 2019-05-22 | End: 2019-05-24

## 2019-05-22 RX ORDER — LIDOCAINE 40 MG/G
CREAM TOPICAL
Status: DISCONTINUED | OUTPATIENT
Start: 2019-05-22 | End: 2019-05-22 | Stop reason: HOSPADM

## 2019-05-22 RX ORDER — SODIUM CHLORIDE, SODIUM LACTATE, POTASSIUM CHLORIDE, CALCIUM CHLORIDE 600; 310; 30; 20 MG/100ML; MG/100ML; MG/100ML; MG/100ML
INJECTION, SOLUTION INTRAVENOUS CONTINUOUS
Status: DISCONTINUED | OUTPATIENT
Start: 2019-05-22 | End: 2019-05-24

## 2019-05-22 RX ORDER — ONDANSETRON 4 MG/1
4 TABLET, ORALLY DISINTEGRATING ORAL EVERY 30 MIN PRN
Status: DISCONTINUED | OUTPATIENT
Start: 2019-05-22 | End: 2019-05-22 | Stop reason: HOSPADM

## 2019-05-22 RX ORDER — SODIUM CHLORIDE, SODIUM LACTATE, POTASSIUM CHLORIDE, CALCIUM CHLORIDE 600; 310; 30; 20 MG/100ML; MG/100ML; MG/100ML; MG/100ML
INJECTION, SOLUTION INTRAVENOUS CONTINUOUS PRN
Status: DISCONTINUED | OUTPATIENT
Start: 2019-05-22 | End: 2019-05-22

## 2019-05-22 RX ORDER — LIDOCAINE HYDROCHLORIDE 20 MG/ML
INJECTION, SOLUTION INFILTRATION; PERINEURAL PRN
Status: DISCONTINUED | OUTPATIENT
Start: 2019-05-22 | End: 2019-05-22

## 2019-05-22 RX ORDER — LABETALOL 20 MG/4 ML (5 MG/ML) INTRAVENOUS SYRINGE
10 EVERY 6 HOURS PRN
Status: DISCONTINUED | OUTPATIENT
Start: 2019-05-22 | End: 2019-05-24 | Stop reason: HOSPADM

## 2019-05-22 RX ADMIN — PROPOFOL 150 MG: 10 INJECTION, EMULSION INTRAVENOUS at 15:24

## 2019-05-22 RX ADMIN — Medication 100 MG: at 15:24

## 2019-05-22 RX ADMIN — FENTANYL CITRATE 50 MCG: 50 INJECTION, SOLUTION INTRAMUSCULAR; INTRAVENOUS at 15:24

## 2019-05-22 RX ADMIN — DEXAMETHASONE SODIUM PHOSPHATE 6 MG: 4 INJECTION, SOLUTION INTRAMUSCULAR; INTRAVENOUS at 22:22

## 2019-05-22 RX ADMIN — PROPOFOL 70 MG: 10 INJECTION, EMULSION INTRAVENOUS at 15:46

## 2019-05-22 RX ADMIN — MIDAZOLAM 2 MG: 1 INJECTION INTRAMUSCULAR; INTRAVENOUS at 15:11

## 2019-05-22 RX ADMIN — PROPOFOL 30 MG: 10 INJECTION, EMULSION INTRAVENOUS at 15:47

## 2019-05-22 RX ADMIN — SODIUM CHLORIDE, POTASSIUM CHLORIDE, SODIUM LACTATE AND CALCIUM CHLORIDE: 600; 310; 30; 20 INJECTION, SOLUTION INTRAVENOUS at 15:11

## 2019-05-22 RX ADMIN — DEXAMETHASONE SODIUM PHOSPHATE 6 MG: 4 INJECTION, SOLUTION INTRA-ARTICULAR; INTRALESIONAL; INTRAMUSCULAR; INTRAVENOUS; SOFT TISSUE at 15:50

## 2019-05-22 RX ADMIN — LIDOCAINE HYDROCHLORIDE 50 MG: 20 INJECTION, SOLUTION INFILTRATION; PERINEURAL at 15:24

## 2019-05-22 RX ADMIN — GLYCOPYRROLATE 0.2 MG: 0.2 INJECTION, SOLUTION INTRAMUSCULAR; INTRAVENOUS at 15:40

## 2019-05-22 ASSESSMENT — ACTIVITIES OF DAILY LIVING (ADL): ADLS_ACUITY_SCORE: 12

## 2019-05-22 ASSESSMENT — MIFFLIN-ST. JEOR: SCORE: 1657

## 2019-05-22 NOTE — ANESTHESIA CARE TRANSFER NOTE
Patient: Marcelo Galvez    Procedure(s):  Attempted Intubation, Unable to Intubate, Cancelled Case    Diagnosis: Substernal Thyroid Goiter  Diagnosis Additional Information: No value filed.    Anesthesia Type:   No value filed.     Note:  Airway :Face Mask  Patient transferred to:PACU  Comments: Pt awake, alert, responding appropriately. Stable, report to RN. Handoff Report: Identifed the Patient, Identified the Reponsible Provider, Reviewed the pertinent medical history, Discussed the surgical course, Reviewed Intra-OP anesthesia mangement and issues during anesthesia, Set expectations for post-procedure period and Allowed opportunity for questions and acknowledgement of understanding      Vitals: (Last set prior to Anesthesia Care Transfer)    CRNA VITALS  5/22/2019 1528 - 5/22/2019 1628      5/22/2019             EKG:  Sinus rhythm                Electronically Signed By: YUDELKA Carlton CRNA  May 22, 2019  4:42 PM

## 2019-05-22 NOTE — ANESTHESIA POSTPROCEDURE EVALUATION
Anesthesia POST Procedure Evaluation    Patient: Marcelo Galvez   MRN:     [id number] Gender:   female   Age:    49 year old :      1970        Preoperative Diagnosis: Substernal Thyroid Goiter   Procedure(s):  Attempted Intubation, Unable to Intubate, Cancelled Case   Postop Comments: No value filed.       Anesthesia Type:  General  No value filed.    Reportable Event: YES     PAIN: Uncomplicated   Sign Out status: Comfortable, Well controlled pain     PONV: No PONV   Sign Out status:  No Nausea or Vomiting     Neuro/Psych: Uneventful perioperative course   Sign Out Status: Preoperative baseline; Age appropriate mentation     Airway/Resp.: Uneventful perioperative course   Sign Out Status: Non labored breathing, age appropriate RR; Resp. Status within EXPECTED Parameters     CV: Uneventful perioperative course   Sign Out status: Appropriate BP and perfusion indices; Appropriate HR/Rhythm     Disposition:   Sign Out in:  PACU  Disposition:  Floor  Recovery Course: Uneventful  Follow-Up: Not required     Comments/Narrative:  Case aborted secondary to inability to intubate patient.  Patient received premedication with 2mg versed, standard induction dose of propofol and 100mg succinylcholine.  Difficult mask, however was able to ventilate with oral airway and two handed mask technique.  The presence of her large goiter resulted in significant tracheal displacement to the left.  Upon initial view with CMAC 3 blade, we were unable to view cords, grade 3 view.  Patient suctioned due to heavy secretions, bougie was procured and passed blindly.  ETT exchanged over and connected to circuit with no end tidal CO2.  ETT was removed, and second time mask ventilation proved more difficult, so LMA size 4 was inserted.  Patient began to spontaneously ventilate at this time, so LMA was removed.  After discussion with surgeon, it was agreed to attempt to take a second look with a different instrument.  Patient was given  0.2mg of glycopyrolate and 90mg propofol with glidescope present with a 4 blade.  We were unable to obtain a good view due to restriction of mouth opening as the anesthesiology and surgery teams were not comfortable re-paralyzing the patient in the setting in addition to the fact she had proven difficult to mask and now there may likely be additional airway edema/swelling.  Case was cancelled, patient began breathing spontaneously shortly after failed second look.  SpO2 was stable throughout.           Last Anesthesia Record Vitals:  CRNA VITALS  5/22/2019 1528 - 5/22/2019 1628      5/22/2019             EKG:  Sinus rhythm          Last PACU Vitals:  Vitals Value Taken Time   /106 5/22/2019  4:50 PM   Temp 36.9  C (98.4  F) 5/22/2019  4:30 PM   Pulse 95 5/22/2019  4:50 PM   Resp 24 5/22/2019  4:45 PM   SpO2 96 % 5/22/2019  4:51 PM   Temp src     NIBP     Pulse     SpO2     Resp     Temp     Ht Rate     Temp 2     Vitals shown include unvalidated device data.      Electronically Signed By: Jhony Sprague MD, May 22, 2019, 5:12 PM

## 2019-05-22 NOTE — BRIEF OP NOTE
[facility], [place]    Brief Operative Note    Pre-operative diagnosis: Substernal Thyroid Goiter  Post-operative diagnosis Same  Procedure: Procedure(s):  PROCEDURE ABORTED DUE TO FAILURE TO ACHIEVE AIRWAY  Surgeon: Surgeon(s) and Role:     * Frances Tadeo MD - Primary

## 2019-05-22 NOTE — ANESTHESIA PREPROCEDURE EVALUATION
Anesthesia Pre-Procedure Evaluation    Patient: Marcelo Galvez   MRN:     [id number] Gender:   female   Age:    49 year old :      1970        Preoperative Diagnosis: Substernal Thyroid Goiter   Procedure(s):  Right Thyroid Lobectomy     History reviewed. No pertinent past medical history.   Past Surgical History:   Procedure Laterality Date     GYN SURGERY                Anesthesia Evaluation     . Pt has had prior anesthetic.     No history of anesthetic complications          ROS/MED HX    ENT/Pulmonary:  - neg pulmonary ROS     Neurologic:  - neg neurologic ROS     Cardiovascular:  - neg cardiovascular ROS       METS/Exercise Tolerance:  >4 METS   Hematologic:  - neg hematologic  ROS       Musculoskeletal:         GI/Hepatic:  - neg GI/hepatic ROS       Renal/Genitourinary:  - ROS Renal section negative       Endo:     (+) thyroid problem (thyroid nodule) Obesity, .      Psychiatric:  - neg psychiatric ROS       Infectious Disease:         Malignancy:         Other:    (+) No chance of pregnancy no H/O Chronic Pain,                       PHYSICAL EXAM:   Mental Status/Neuro:    Airway: Facies: Feasible  Mallampati: II  Mouth/Opening: Full  TM distance: > 6 cm  Neck ROM: Full   Respiratory: Auscultation: CTAB     Resp. Rate: Normal     Resp. Effort: Normal      CV: Rhythm: Regular  Rate: Age appropriate  Heart: Normal Sounds   Comments:                    Lab Results   Component Value Date    HGB 12.6 2019    POTASSIUM 4.0 2016    CR 0.77 2016    GLC 77.0 2016    TSH 0.50 2018    T4 0.87 2018    HCG Negative 2019       Preop Vitals  BP Readings from Last 3 Encounters:   19 (!) 135/95   18 129/86   18 (!) 131/91    Pulse Readings from Last 3 Encounters:   19 87   18 88   18 91      Resp Readings from Last 3 Encounters:   19 18   18 18    SpO2 Readings from Last 3 Encounters:   19 98%   18 97%  "  12/05/18 97%      Temp Readings from Last 1 Encounters:   05/22/19 37.1  C (98.7  F) (Oral)    Ht Readings from Last 1 Encounters:   05/22/19 1.626 m (5' 4\")      Wt Readings from Last 1 Encounters:   05/22/19 104.2 kg (229 lb 11.5 oz)    Estimated body mass index is 39.43 kg/m  as calculated from the following:    Height as of this encounter: 1.626 m (5' 4\").    Weight as of this encounter: 104.2 kg (229 lb 11.5 oz).     LDA:  Peripheral IV 05/22/19 Left Hand (Active)   Site Assessment WDL 5/22/2019 12:57 PM   Line Status Saline locked 5/22/2019 12:57 PM   Phlebitis Scale 0-->no symptoms 5/22/2019 12:57 PM   Number of days: 0            Assessment:   ASA SCORE: 1    NPO Status: > 6 hours since completed [employer]   Documentation: H&P complete; Preop Testing complete; Consents complete   Proceeding: Proceed without further delay  Tobacco Use:  NO Active use of Tobacco/UNKNOWN Tobacco use status     Plan:   Anes. Type:  General   Pre-Induction: Midazolam IV; Acetaminophen PO   Induction:  IV (Standard)   Airway: Oral ETT   Access/Monitoring: PIV; 2nd PIV   Maintenance: Balanced   Emergence: Procedure Site   Logistics: Same Day Surgery     Postop Pain/Sedation Strategy:  Standard-Options: Opioids PRN     PONV Management:  Adult Risk Factors: Female, Non-Smoker, Postop Opioids  Prevention: Ondansetron; Dexamethasone     CONSENT: Direct conversation   Plan and risks discussed with: Patient   Blood Products: Consent Deferred (Minimal Blood Loss)       Comments for Plan/Consent:  49F with no PMH but thyroid nodule x several years here for resection.  ASA 1.  Plan: GETA, PIV x2.                         Stacey Vallejo MD  "

## 2019-05-23 ENCOUNTER — ANESTHESIA EVENT (OUTPATIENT)
Dept: SURGERY | Facility: CLINIC | Age: 49
End: 2019-05-23
Payer: COMMERCIAL

## 2019-05-23 ENCOUNTER — ANESTHESIA (OUTPATIENT)
Dept: SURGERY | Facility: CLINIC | Age: 49
End: 2019-05-23
Payer: COMMERCIAL

## 2019-05-23 PROBLEM — E89.0 S/P PARTIAL THYROIDECTOMY: Status: ACTIVE | Noted: 2019-05-23

## 2019-05-23 LAB — GLUCOSE BLDC GLUCOMTR-MCNC: 133 MG/DL (ref 70–99)

## 2019-05-23 PROCEDURE — 25000132 ZZH RX MED GY IP 250 OP 250 PS 637: Performed by: STUDENT IN AN ORGANIZED HEALTH CARE EDUCATION/TRAINING PROGRAM

## 2019-05-23 PROCEDURE — 71000015 ZZH RECOVERY PHASE 1 LEVEL 2 EA ADDTL HR: Performed by: SURGERY

## 2019-05-23 PROCEDURE — 25800030 ZZH RX IP 258 OP 636: Performed by: NURSE ANESTHETIST, CERTIFIED REGISTERED

## 2019-05-23 PROCEDURE — 37000008 ZZH ANESTHESIA TECHNICAL FEE, 1ST 30 MIN: Performed by: SURGERY

## 2019-05-23 PROCEDURE — 37000009 ZZH ANESTHESIA TECHNICAL FEE, EACH ADDTL 15 MIN: Performed by: SURGERY

## 2019-05-23 PROCEDURE — 25000128 H RX IP 250 OP 636: Performed by: NURSE ANESTHETIST, CERTIFIED REGISTERED

## 2019-05-23 PROCEDURE — 25800030 ZZH RX IP 258 OP 636: Performed by: STUDENT IN AN ORGANIZED HEALTH CARE EDUCATION/TRAINING PROGRAM

## 2019-05-23 PROCEDURE — 27210794 ZZH OR GENERAL SUPPLY STERILE: Performed by: SURGERY

## 2019-05-23 PROCEDURE — 88307 TISSUE EXAM BY PATHOLOGIST: CPT | Performed by: SURGERY

## 2019-05-23 PROCEDURE — 25000566 ZZH SEVOFLURANE, EA 15 MIN: Performed by: SURGERY

## 2019-05-23 PROCEDURE — 40000171 ZZH STATISTIC PRE-PROCEDURE ASSESSMENT III: Performed by: SURGERY

## 2019-05-23 PROCEDURE — 25000125 ZZHC RX 250: Performed by: NURSE ANESTHETIST, CERTIFIED REGISTERED

## 2019-05-23 PROCEDURE — 71000014 ZZH RECOVERY PHASE 1 LEVEL 2 FIRST HR: Performed by: SURGERY

## 2019-05-23 PROCEDURE — 27211024 ZZHC OR SUPPLY OTHER OPNP: Performed by: SURGERY

## 2019-05-23 PROCEDURE — 25000128 H RX IP 250 OP 636: Performed by: ANESTHESIOLOGY

## 2019-05-23 PROCEDURE — 00000146 ZZHCL STATISTIC GLUCOSE BY METER IP

## 2019-05-23 PROCEDURE — 36000062 ZZH SURGERY LEVEL 4 1ST 30 MIN - UMMC: Performed by: SURGERY

## 2019-05-23 PROCEDURE — G0378 HOSPITAL OBSERVATION PER HR: HCPCS

## 2019-05-23 PROCEDURE — 25000128 H RX IP 250 OP 636: Performed by: STUDENT IN AN ORGANIZED HEALTH CARE EDUCATION/TRAINING PROGRAM

## 2019-05-23 PROCEDURE — 36000064 ZZH SURGERY LEVEL 4 EA 15 ADDTL MIN - UMMC: Performed by: SURGERY

## 2019-05-23 PROCEDURE — 25000132 ZZH RX MED GY IP 250 OP 250 PS 637: Performed by: ANESTHESIOLOGY

## 2019-05-23 PROCEDURE — 25800030 ZZH RX IP 258 OP 636: Performed by: ANESTHESIOLOGY

## 2019-05-23 RX ORDER — SODIUM CHLORIDE, SODIUM LACTATE, POTASSIUM CHLORIDE, CALCIUM CHLORIDE 600; 310; 30; 20 MG/100ML; MG/100ML; MG/100ML; MG/100ML
INJECTION, SOLUTION INTRAVENOUS CONTINUOUS
Status: DISCONTINUED | OUTPATIENT
Start: 2019-05-23 | End: 2019-05-23 | Stop reason: HOSPADM

## 2019-05-23 RX ORDER — SODIUM CHLORIDE, SODIUM LACTATE, POTASSIUM CHLORIDE, CALCIUM CHLORIDE 600; 310; 30; 20 MG/100ML; MG/100ML; MG/100ML; MG/100ML
INJECTION, SOLUTION INTRAVENOUS CONTINUOUS PRN
Status: DISCONTINUED | OUTPATIENT
Start: 2019-05-23 | End: 2019-05-23

## 2019-05-23 RX ORDER — ONDANSETRON 4 MG/1
4 TABLET, ORALLY DISINTEGRATING ORAL EVERY 6 HOURS PRN
Status: DISCONTINUED | OUTPATIENT
Start: 2019-05-23 | End: 2019-05-24 | Stop reason: HOSPADM

## 2019-05-23 RX ORDER — ACETAMINOPHEN 325 MG/1
975 TABLET ORAL EVERY 8 HOURS PRN
Status: DISCONTINUED | OUTPATIENT
Start: 2019-05-23 | End: 2019-05-24 | Stop reason: HOSPADM

## 2019-05-23 RX ORDER — ONDANSETRON 2 MG/ML
4 INJECTION INTRAMUSCULAR; INTRAVENOUS EVERY 6 HOURS PRN
Status: DISCONTINUED | OUTPATIENT
Start: 2019-05-23 | End: 2019-05-24 | Stop reason: HOSPADM

## 2019-05-23 RX ORDER — FENTANYL CITRATE 50 UG/ML
25-50 INJECTION, SOLUTION INTRAMUSCULAR; INTRAVENOUS
Status: DISCONTINUED | OUTPATIENT
Start: 2019-05-23 | End: 2019-05-23 | Stop reason: HOSPADM

## 2019-05-23 RX ORDER — PROPOFOL 10 MG/ML
INJECTION, EMULSION INTRAVENOUS PRN
Status: DISCONTINUED | OUTPATIENT
Start: 2019-05-23 | End: 2019-05-23

## 2019-05-23 RX ORDER — ONDANSETRON 2 MG/ML
INJECTION INTRAMUSCULAR; INTRAVENOUS PRN
Status: DISCONTINUED | OUTPATIENT
Start: 2019-05-23 | End: 2019-05-23

## 2019-05-23 RX ORDER — ONDANSETRON 4 MG/1
4 TABLET, ORALLY DISINTEGRATING ORAL EVERY 30 MIN PRN
Status: DISCONTINUED | OUTPATIENT
Start: 2019-05-23 | End: 2019-05-23 | Stop reason: HOSPADM

## 2019-05-23 RX ORDER — DEXMEDETOMIDINE HYDROCHLORIDE 4 UG/ML
0.2-1.2 INJECTION, SOLUTION INTRAVENOUS CONTINUOUS
Status: DISCONTINUED | OUTPATIENT
Start: 2019-05-23 | End: 2019-05-23 | Stop reason: HOSPADM

## 2019-05-23 RX ORDER — ACETAMINOPHEN 325 MG/1
650 TABLET ORAL EVERY 4 HOURS PRN
Qty: 1 BOTTLE | Refills: 0 | Status: SHIPPED | OUTPATIENT
Start: 2019-05-23

## 2019-05-23 RX ORDER — ESMOLOL HYDROCHLORIDE 10 MG/ML
INJECTION INTRAVENOUS PRN
Status: DISCONTINUED | OUTPATIENT
Start: 2019-05-23 | End: 2019-05-23

## 2019-05-23 RX ORDER — AMOXICILLIN 250 MG
1-2 CAPSULE ORAL 2 TIMES DAILY
Qty: 20 TABLET | Refills: 0 | Status: SHIPPED | OUTPATIENT
Start: 2019-05-23

## 2019-05-23 RX ORDER — ONDANSETRON 2 MG/ML
4 INJECTION INTRAMUSCULAR; INTRAVENOUS EVERY 30 MIN PRN
Status: DISCONTINUED | OUTPATIENT
Start: 2019-05-23 | End: 2019-05-23 | Stop reason: HOSPADM

## 2019-05-23 RX ORDER — OXYCODONE HYDROCHLORIDE 5 MG/1
5-10 TABLET ORAL EVERY 6 HOURS PRN
Qty: 20 TABLET | Refills: 0 | Status: SHIPPED | OUTPATIENT
Start: 2019-05-23 | End: 2019-05-24

## 2019-05-23 RX ORDER — GLYCOPYRROLATE 0.2 MG/ML
INJECTION, SOLUTION INTRAMUSCULAR; INTRAVENOUS PRN
Status: DISCONTINUED | OUTPATIENT
Start: 2019-05-23 | End: 2019-05-23

## 2019-05-23 RX ORDER — DEXAMETHASONE SODIUM PHOSPHATE 4 MG/ML
INJECTION, SOLUTION INTRA-ARTICULAR; INTRALESIONAL; INTRAMUSCULAR; INTRAVENOUS; SOFT TISSUE PRN
Status: DISCONTINUED | OUTPATIENT
Start: 2019-05-23 | End: 2019-05-23

## 2019-05-23 RX ORDER — HYDROMORPHONE HYDROCHLORIDE 1 MG/ML
.3-.5 INJECTION, SOLUTION INTRAMUSCULAR; INTRAVENOUS; SUBCUTANEOUS EVERY 5 MIN PRN
Status: DISCONTINUED | OUTPATIENT
Start: 2019-05-23 | End: 2019-05-23 | Stop reason: HOSPADM

## 2019-05-23 RX ORDER — NALOXONE HYDROCHLORIDE 0.4 MG/ML
.1-.4 INJECTION, SOLUTION INTRAMUSCULAR; INTRAVENOUS; SUBCUTANEOUS
Status: DISCONTINUED | OUTPATIENT
Start: 2019-05-23 | End: 2019-05-23

## 2019-05-23 RX ORDER — FENTANYL CITRATE 50 UG/ML
INJECTION, SOLUTION INTRAMUSCULAR; INTRAVENOUS PRN
Status: DISCONTINUED | OUTPATIENT
Start: 2019-05-23 | End: 2019-05-23

## 2019-05-23 RX ORDER — LIDOCAINE 40 MG/G
CREAM TOPICAL
Status: DISCONTINUED | OUTPATIENT
Start: 2019-05-23 | End: 2019-05-23

## 2019-05-23 RX ADMIN — SODIUM CHLORIDE, POTASSIUM CHLORIDE, SODIUM LACTATE AND CALCIUM CHLORIDE: 600; 310; 30; 20 INJECTION, SOLUTION INTRAVENOUS at 13:24

## 2019-05-23 RX ADMIN — OXYCODONE HYDROCHLORIDE 5 MG: 5 SOLUTION ORAL at 22:06

## 2019-05-23 RX ADMIN — SUGAMMADEX 200 MG: 100 INJECTION, SOLUTION INTRAVENOUS at 15:30

## 2019-05-23 RX ADMIN — SODIUM CHLORIDE, POTASSIUM CHLORIDE, SODIUM LACTATE AND CALCIUM CHLORIDE: 600; 310; 30; 20 INJECTION, SOLUTION INTRAVENOUS at 12:53

## 2019-05-23 RX ADMIN — ACETAMINOPHEN 975 MG: 325 TABLET, FILM COATED ORAL at 21:01

## 2019-05-23 RX ADMIN — ROCURONIUM BROMIDE 30 MG: 10 INJECTION INTRAVENOUS at 14:40

## 2019-05-23 RX ADMIN — FENTANYL CITRATE 50 MCG: 50 INJECTION INTRAMUSCULAR; INTRAVENOUS at 16:13

## 2019-05-23 RX ADMIN — DEXMEDETOMIDINE HYDROCHLORIDE 20 MCG: 100 INJECTION, SOLUTION INTRAVENOUS at 13:08

## 2019-05-23 RX ADMIN — ESMOLOL HYDROCHLORIDE 20 MG: 10 INJECTION, SOLUTION INTRAVENOUS at 14:28

## 2019-05-23 RX ADMIN — DEXMEDETOMIDINE HYDROCHLORIDE 20 MCG: 100 INJECTION, SOLUTION INTRAVENOUS at 13:03

## 2019-05-23 RX ADMIN — FENTANYL CITRATE 50 MCG: 50 INJECTION INTRAMUSCULAR; INTRAVENOUS at 16:25

## 2019-05-23 RX ADMIN — PROPOFOL 100 MG: 10 INJECTION, EMULSION INTRAVENOUS at 13:20

## 2019-05-23 RX ADMIN — DEXAMETHASONE SODIUM PHOSPHATE 6 MG: 4 INJECTION, SOLUTION INTRAMUSCULAR; INTRAVENOUS at 21:59

## 2019-05-23 RX ADMIN — GLYCOPYRROLATE 0.2 MG: 0.2 INJECTION, SOLUTION INTRAMUSCULAR; INTRAVENOUS at 12:52

## 2019-05-23 RX ADMIN — DEXMEDETOMIDINE HYDROCHLORIDE 20 MCG: 100 INJECTION, SOLUTION INTRAVENOUS at 13:04

## 2019-05-23 RX ADMIN — FENTANYL CITRATE 50 MCG: 50 INJECTION, SOLUTION INTRAMUSCULAR; INTRAVENOUS at 13:10

## 2019-05-23 RX ADMIN — DEXAMETHASONE SODIUM PHOSPHATE 10 MG: 4 INJECTION, SOLUTION INTRA-ARTICULAR; INTRALESIONAL; INTRAMUSCULAR; INTRAVENOUS; SOFT TISSUE at 13:35

## 2019-05-23 RX ADMIN — FENTANYL CITRATE 50 MCG: 50 INJECTION, SOLUTION INTRAMUSCULAR; INTRAVENOUS at 15:56

## 2019-05-23 RX ADMIN — ONDANSETRON 4 MG: 2 INJECTION INTRAMUSCULAR; INTRAVENOUS at 15:08

## 2019-05-23 RX ADMIN — DEXAMETHASONE SODIUM PHOSPHATE 6 MG: 4 INJECTION, SOLUTION INTRAMUSCULAR; INTRAVENOUS at 04:13

## 2019-05-23 RX ADMIN — PROPOFOL 50 MG: 10 INJECTION, EMULSION INTRAVENOUS at 14:40

## 2019-05-23 RX ADMIN — FENTANYL CITRATE 50 MCG: 50 INJECTION, SOLUTION INTRAMUSCULAR; INTRAVENOUS at 13:47

## 2019-05-23 RX ADMIN — FENTANYL CITRATE 50 MCG: 50 INJECTION, SOLUTION INTRAMUSCULAR; INTRAVENOUS at 14:14

## 2019-05-23 RX ADMIN — HYDROMORPHONE HYDROCHLORIDE 0.5 MG: 1 INJECTION, SOLUTION INTRAMUSCULAR; INTRAVENOUS; SUBCUTANEOUS at 16:45

## 2019-05-23 RX ADMIN — MIDAZOLAM 1 MG: 1 INJECTION INTRAMUSCULAR; INTRAVENOUS at 12:55

## 2019-05-23 RX ADMIN — SODIUM CHLORIDE, POTASSIUM CHLORIDE, SODIUM LACTATE AND CALCIUM CHLORIDE: 600; 310; 30; 20 INJECTION, SOLUTION INTRAVENOUS at 01:10

## 2019-05-23 RX ADMIN — HYDROMORPHONE HYDROCHLORIDE 0.5 MG: 1 INJECTION, SOLUTION INTRAMUSCULAR; INTRAVENOUS; SUBCUTANEOUS at 16:55

## 2019-05-23 RX ADMIN — GLYCOPYRROLATE 0.2 MG: 0.2 INJECTION, SOLUTION INTRAMUSCULAR; INTRAVENOUS at 13:03

## 2019-05-23 RX ADMIN — ROCURONIUM BROMIDE 40 MG: 10 INJECTION INTRAVENOUS at 13:20

## 2019-05-23 RX ADMIN — MIDAZOLAM 1 MG: 1 INJECTION INTRAMUSCULAR; INTRAVENOUS at 13:10

## 2019-05-23 RX ADMIN — MIDAZOLAM 1 MG: 1 INJECTION INTRAMUSCULAR; INTRAVENOUS at 12:53

## 2019-05-23 RX ADMIN — SODIUM CHLORIDE, POTASSIUM CHLORIDE, SODIUM LACTATE AND CALCIUM CHLORIDE 100 ML/HR: 600; 310; 30; 20 INJECTION, SOLUTION INTRAVENOUS at 16:51

## 2019-05-23 RX ADMIN — FENTANYL CITRATE 50 MCG: 50 INJECTION, SOLUTION INTRAMUSCULAR; INTRAVENOUS at 14:40

## 2019-05-23 RX ADMIN — DEXAMETHASONE SODIUM PHOSPHATE 6 MG: 4 INJECTION, SOLUTION INTRAMUSCULAR; INTRAVENOUS at 09:57

## 2019-05-23 ASSESSMENT — MIFFLIN-ST. JEOR: SCORE: 1646.03

## 2019-05-23 ASSESSMENT — ACTIVITIES OF DAILY LIVING (ADL)
ADLS_ACUITY_SCORE: 11

## 2019-05-23 NOTE — ANESTHESIA PREPROCEDURE EVALUATION
"Anesthesia Pre-Procedure Evaluation    Patient: Marcelo Galvez   MRN:     [id number] Gender:   female   Age:    49 year old :      1970        Preoperative Diagnosis: Right Thyroid Goiter   Procedure(s):  Right Thyroid Lobectomy     History reviewed. No pertinent past medical history.   Past Surgical History:   Procedure Laterality Date     GYN SURGERY                Anesthesia Evaluation     . Pt has had prior anesthetic. Type: General    History of anesthetic complications   - difficult intubation        ROS/MED HX    ENT/Pulmonary:       Neurologic:       Cardiovascular:         METS/Exercise Tolerance:     Hematologic:         Musculoskeletal:         GI/Hepatic:         Renal/Genitourinary:         Endo:     (+) thyroid problem Obesity, .      Psychiatric:         Infectious Disease:         Malignancy:         Other:                         PHYSICAL EXAM:   Mental Status/Neuro: A/A/O   Airway: Facies: Feasible  Mallampati: III  Mouth/Opening: Full  TM distance: > 6 cm  Neck ROM: Full   Respiratory: Auscultation: CTAB     Resp. Rate: Normal     Resp. Effort: Normal      CV: Rhythm: Regular  Rate: Age appropriate  Heart: Normal Sounds   Comments:      Dental: Normal                  Lab Results   Component Value Date    HGB 12.6 2019    POTASSIUM 4.0 2016    CR 0.77 2016    GLC 77.0 2016    TSH 0.50 2018    T4 0.87 2018    HCG Negative 2019       Preop Vitals  BP Readings from Last 3 Encounters:   19 156/88   18 129/86   18 (!) 131/91    Pulse Readings from Last 3 Encounters:   19 98   18 88   18 91      Resp Readings from Last 3 Encounters:   19 18   18 18    SpO2 Readings from Last 3 Encounters:   19 100%   18 97%   18 97%      Temp Readings from Last 1 Encounters:   19 36.6  C (97.8  F) (Oral)    Ht Readings from Last 1 Encounters:   19 1.626 m (5' 4\")      Wt Readings " "from Last 1 Encounters:   05/23/19 103.1 kg (227 lb 4.8 oz)    Estimated body mass index is 39.02 kg/m  as calculated from the following:    Height as of this encounter: 1.626 m (5' 4\").    Weight as of this encounter: 103.1 kg (227 lb 4.8 oz).     LDA:  Peripheral IV 05/22/19 Left Hand (Active)   Site Assessment WDL 5/23/2019  8:00 AM   Line Status Infusing 5/23/2019  8:00 AM   Phlebitis Scale 0-->no symptoms 5/23/2019  8:00 AM   Infiltration Scale 0 5/23/2019  8:00 AM   Infiltration Site Treatment Method  None 5/23/2019  8:00 AM   Extravasation? No 5/23/2019  8:00 AM   Number of days: 1            Assessment:   ASA SCORE: 3    NPO Status: > 6 hours since completed Solid [employer]   Documentation: H&P complete; Preop Testing complete; Consents complete   Proceeding: Proceed without further delay  Tobacco Use:  NO Active use of Tobacco/UNKNOWN Tobacco use status     Plan:   Anes. Type:  General   Pre-Induction: Midazolam IV; Acetaminophen PO   Induction:  IV (Standard)   Airway: Oral ETT     Advanced: Difficult Airway Cart; FOB   Access/Monitoring: PIV; 2nd PIV   Maintenance: Balanced   Emergence: Procedure Site   Logistics: Observation/Admission     Postop Pain/Sedation Strategy:  Standard-Options: Opioids PRN     PONV Management:  Adult Risk Factors: Female, Non-Smoker, Postop Opioids  Prevention: Ondansetron; Dexamethasone     CONSENT: Direct conversation   Plan and risks discussed with: Patient   Blood Products: Consented (ALL Blood Products)                         Oumar Davila MD  "

## 2019-05-23 NOTE — PLAN OF CARE
"Admitted 5/22 for R thyroid lobectomy. First attempt unsuccessful due to failure to achieve airway per MD note     Pain: denies   Nausea: denies   /88 (BP Location: Right arm)   Pulse 98   Temp 98.8  F (37.1  C) (Oral)   Resp 18   Ht 1.626 m (5' 4\")   Wt 103.1 kg (227 lb 4.8 oz)   LMP 05/10/2019   SpO2 99%   BMI 39.02 kg/m     Output: Adequately voiding   Diet: NPO   Activity: SBA   Bowel Function: Bowel sounds heard in all quadrants, passing flatus, no bm this shift   Lines: Left hand PIV, infusing MIVF, dressing CDI   Additional notes: Second shower complete prior to be taken to the OR   Plan: Pt sent to OR for R thyroid lobectomy   "

## 2019-05-23 NOTE — PROGRESS NOTES
"SPIRITUAL HEALTH SERVICES  SPIRITUAL ASSESSMENT Progress Note  [facility]     REFERRAL SOURCE: Request Upon Admission    I met with Marcelo \"Sumeet\" Useh prior to her surgery. She is feeling \"totally at peace\" and \"held by God.\" She trusts that her surgery that was delay yesterday will \"happen when it needs to happen.\" She finds great comfort in prayer, which I offered.    PLAN: I will continue to follow Sumeet for spiritual support during her stay on 7C.    Hattie BejaranoSkillman  Oncology   Pager [phone]    [facility] remains available 24/7 for emergent requests/referrals, either by having the switchboard page the on-call  or by entering an ASAP/STAT consult in Epic (this will also page the on-call ).      "

## 2019-05-23 NOTE — ANESTHESIA CARE TRANSFER NOTE
Patient: Marcelo Galvez    Procedure(s):  Right Thyroid Lobectomy    Diagnosis: Right Thyroid Goiter  Diagnosis Additional Information: No value filed.    Anesthesia Type:   No value filed.     Note:  Airway :Face Mask  Patient transferred to:PACU  Comments: VSS. Breathing spont. Report to RN at bedside.Handoff Report: Identifed the Patient, Identified the Reponsible Provider, Reviewed the pertinent medical history, Discussed the surgical course, Reviewed Intra-OP anesthesia mangement and issues during anesthesia, Set expectations for post-procedure period and Allowed opportunity for questions and acknowledgement of understanding      Vitals: (Last set prior to Anesthesia Care Transfer)    CRNA VITALS  5/23/2019 1518 - 5/23/2019 1553      5/23/2019             Resp Rate (observed):  1  (Abnormal)     Resp Rate (set):  10                Electronically Signed By: YUDELKA Lewis CRNA  May 23, 2019  3:53 PM

## 2019-05-23 NOTE — BRIEF OP NOTE
[facility] [place]    Brief Operative Note    Pre-operative diagnosis: Right Thyroid Goiter  Post-operative diagnosis Right Thyroid Goiter   Procedure: Procedure(s):  Right Thyroid Lobectomy  Surgeon: Surgeon(s) and Role:     * Frances Tadeo MD - Primary     * Bk Ventura MD - Assisting     * Yovani Mccabe MD - Resident - Assisting  Anesthesia: General   Estimated blood loss: 20 mL   Drains: Rogelio-Wynne x1 in right neck   Specimens:   ID Type Source Tests Collected by Time Destination   A : Right Lobe of Thyroid Tissue Thyroid, Right SURGICAL PATHOLOGY EXAM Frances Tadeo MD 5/23/2019  2:52 PM      Findings: Patient difficult to intubate secondary to displaced trachea and larynx. Intubated over flexible fiberoptic scope. Large right thyroid nodule removed. Recurrent laryngeal nerve identified and intact    Complications: None   Implants:  * No implants in log *

## 2019-05-23 NOTE — ANESTHESIA POSTPROCEDURE EVALUATION
Anesthesia POST Procedure Evaluation    Patient: Marcelo Galvez   MRN:     [id number] Gender:   female   Age:    49 year old :      1970        Preoperative Diagnosis: Right Thyroid Goiter   Procedure(s):  Right Thyroid Lobectomy   Postop Comments: No value filed.       Anesthesia Type:  General  No value filed.    Reportable Event: NO     PAIN: Uncomplicated   Sign Out status: Pain at preoperative BASELINE     PONV: No PONV   Sign Out status:  No Nausea or Vomiting     Neuro/Psych: Uneventful perioperative course   Sign Out Status: Preoperative baseline; Age appropriate mentation     Airway/Resp.: Uneventful perioperative course   Sign Out Status: Non labored breathing, age appropriate RR; Resp. Status within EXPECTED Parameters     CV: Uneventful perioperative course   Sign Out status: Appropriate BP and perfusion indices; Appropriate HR/Rhythm     Disposition:   Sign Out in:  PACU  Disposition:  Floor  Recovery Course: Uneventful  Follow-Up: Not required     Comments/Narrative:  Pt complains of sore throat. Will give some cetacaine spray. No stridor noted in PACU. Pt resting and breathing comfortably            Last Anesthesia Record Vitals:  CRNA VITALS  2019 1518 - 2019 1618      2019             NIBP:  136/84    Pulse:  97          Last PACU Vitals:  Vitals Value Taken Time   /91 2019  4:40 PM   Temp 36.7  C (98  F) 2019  4:15 PM   Pulse 97 2019  4:40 PM   Resp 20 2019  4:15 PM   SpO2 100 % 2019  4:47 PM   Temp src     NIBP 136/84 2019  3:55 PM   Pulse 97 2019  3:55 PM   SpO2     Resp     Temp     Ht Rate     Temp 2     Vitals shown include unvalidated device data.      Electronically Signed By: El Ward MD, May 23, 2019, 4:48 PM

## 2019-05-23 NOTE — PLAN OF CARE
Hypertensive (not within parameters for Labetalol) and resolved on its own. Other VSS. Denies pain and nausea. NPO after midnight for right thyroid lobectomy. IV Dexamethasone given. Voiding spontaneously. Up with stand by assist. IV fluids via PIV. Continue with plan of care - needs pre-op shower #2 this morning, plan to leave unit at 10:30-11:00 for OR.

## 2019-05-23 NOTE — PLAN OF CARE
VSS, hypertensive at times, MD notified. Denies pain. Tolerating diet. NPO at midnight, pt aware. PIV saline locked. Voiding, good amounts. Up with SBA. Pre-op shower done tonight. Pt resting comfortably. Continue POC.

## 2019-05-24 VITALS
SYSTOLIC BLOOD PRESSURE: 143 MMHG | RESPIRATION RATE: 23 BRPM | WEIGHT: 227.3 LBS | TEMPERATURE: 97.6 F | DIASTOLIC BLOOD PRESSURE: 89 MMHG | BODY MASS INDEX: 38.8 KG/M2 | OXYGEN SATURATION: 96 % | HEIGHT: 64 IN | HEART RATE: 101 BPM

## 2019-05-24 LAB — GLUCOSE BLDC GLUCOMTR-MCNC: 115 MG/DL (ref 70–99)

## 2019-05-24 PROCEDURE — 25000132 ZZH RX MED GY IP 250 OP 250 PS 637: Performed by: ANESTHESIOLOGY

## 2019-05-24 PROCEDURE — 25000128 H RX IP 250 OP 636: Performed by: STUDENT IN AN ORGANIZED HEALTH CARE EDUCATION/TRAINING PROGRAM

## 2019-05-24 PROCEDURE — 00000146 ZZHCL STATISTIC GLUCOSE BY METER IP

## 2019-05-24 PROCEDURE — G0378 HOSPITAL OBSERVATION PER HR: HCPCS

## 2019-05-24 PROCEDURE — 25000132 ZZH RX MED GY IP 250 OP 250 PS 637: Performed by: STUDENT IN AN ORGANIZED HEALTH CARE EDUCATION/TRAINING PROGRAM

## 2019-05-24 RX ORDER — OXYCODONE HYDROCHLORIDE 5 MG/1
5 TABLET ORAL EVERY 6 HOURS PRN
Qty: 25 TABLET | Refills: 0 | Status: SHIPPED | OUTPATIENT
Start: 2019-05-24 | End: 2019-05-24

## 2019-05-24 RX ORDER — OXYCODONE HYDROCHLORIDE 5 MG/1
5 TABLET ORAL EVERY 6 HOURS PRN
Qty: 24 TABLET | Refills: 0 | Status: SHIPPED | OUTPATIENT
Start: 2019-05-24

## 2019-05-24 RX ADMIN — OXYCODONE HYDROCHLORIDE 5 MG: 5 SOLUTION ORAL at 02:57

## 2019-05-24 RX ADMIN — ACETAMINOPHEN 975 MG: 325 TABLET, FILM COATED ORAL at 05:22

## 2019-05-24 RX ADMIN — DEXAMETHASONE SODIUM PHOSPHATE 6 MG: 4 INJECTION, SOLUTION INTRAMUSCULAR; INTRAVENOUS at 05:02

## 2019-05-24 ASSESSMENT — PAIN DESCRIPTION - DESCRIPTORS
DESCRIPTORS: DULL
DESCRIPTORS: DULL

## 2019-05-24 NOTE — PLAN OF CARE
Observation Goals:  - Pain controlled - yes, on oral Tylenol and Oxycodone  - Breathing on room air - yes, on room air  - Tolerating a diet -Yes, diet advanced to low fiber.  - Voiding independently - yes  - Ambulating independently - Yes, up independently.    Patient Was seen and rounded by MD and JT drain removed. Dressing in place with slight shadow noted. Anterior neck incision dry intact, well approximated with no signs of  early signs of infection, any drainage. Independent with ambulation and ADls and showered. Discharge order place. Will discharge after lunch.

## 2019-05-24 NOTE — OP NOTE
May 23, 2019    Preoperative diagnosis: Large adenoma of the right hemithyroid Postoperative diagnosis: Large adenoma of the right hemithyroid  Procedure:  Right hemithyroidectomy    Surgeon: Skye Tadeo MD  Assistant surgeon: Bk Ventura MD  Anesthesia:  GETA    Indication:  I was asked by Dr. Tadeo to provide intraoperative assistance for excision of an extremely large right thyroid mass.  The mass was large, and the complexity of the procedure required 2 attending surgeons.     Procedure:  When I arrived in the room, Dr. Tadeo had already opened the surgical field and mobilized the medial portion of the right thyroid lobe.  I assisted her in providing retraction and mobilization of this extremely large mass in the superior, lateral, and inferior aspect of the lobe.  The size of the mass put the surrounding structures including the recurrent nerve and parathyroids at risk.  Inadvertent injury could have resulted in significant blood loss.    I assisted Dr. Tadeo with mobilization and hemostasis.  We sustained minimal blood loss, and were able to preserve the recurrent laryngeal nerve on the right, and structures that appeared to be the superior and inferior parathyroid glands.    After delivery of the right hemithyroid lobe, I scrubbed out.  Please see Dr. Tadeo's dictation for details about the rest of the operation.

## 2019-05-24 NOTE — PROGRESS NOTES
Observation Goals:  - Pain controlled - yes, on oral Tylenol and Oxycodone  - Breathing on room air - yes, on room air  - Tolerating a diet - no, reports no appetite  - Voiding independently - yes  - Ambulating independently - no, up with stand by assist, reported some dizziness

## 2019-05-24 NOTE — PLAN OF CARE
Observation Goals:  - Pain controlled - yes, on oral Tylenol and Oxycodone  - Breathing on room air - yes, on room air  - Tolerating a diet - no, reports no appetite  - Voiding independently - yes  - Ambulating independently - no, up with stand by assist    AVSS. Capno WNL. Pain managed with Tylenol, Oxycodone and cold packs. On a full liquid diet, had sips of clears overnight. Denied nausea. Neck incision dermabond. JT with bloody output. Up with stand by assist. Voiding spontaneously. Passing gas. PIV x2 saline locked. Continue with plan of care.

## 2019-05-24 NOTE — PLAN OF CARE
Patient discharged from unit to home at 1145, ambulatory and accompanied by . Discharge instructions given including new medications, restriction as well as future MD appointments. Verbalized understanding of discontinue instructions and had no questions. Personal belongings taken by patient.

## 2019-05-24 NOTE — PROGRESS NOTES
"POST OP CHECK     S: Pt seen awake, alert, and interactive. No voice change or difficulty breathing. Has tried clear liquids, ambulated, and voided. No sob or chest pain.    O:   /89   Pulse 100   Temp 98.2  F (36.8  C) (Oral)   Resp 20   Ht 1.626 m (5' 4\")   Wt 103.1 kg (227 lb 4.8 oz)   LMP 05/10/2019   SpO2 99%   BMI 39.02 kg/m      GEN: NAD  CV: Non-cyanotic  Neck: Incision c/d/i. Airway patent. JT with minimal serosanguinous output.   RESP: Nonlabored breathing  ABD: soft, appropriately tender, without guarding or rebound tenderness.  PSYCH: cooperative     A/P: 48 yo F with history of large right thyroid goiter now POD#0 status post right thyroid lobectomy. Noted to have a difficult intubation secondary to displaced trachea and larynx.     Tylenol, PRN oxycodone  PRN zofran      Remainder of cares per primary team.     Concha Gillette MD   Surgery PGY-1           "

## 2019-05-24 NOTE — PLAN OF CARE
OBS GOALS    - Pain controlled: pt reports pain is well controlled. No pain meds needed since arriving on unit.   - Breathing on room air: 02 sats in mid-90s on 1L  - Tolerating a diet: pt states that she has no appetite.   - Voiding independently: yes  - Ambulating independently: up with SBA, reports some dizziness with standing

## 2019-05-24 NOTE — PROGRESS NOTES
Observation Goals:  - Pain controlled - yes, on oral Tylenol and Oxycodone  - Breathing on room air - yes, on room air  - Tolerating a diet - no, reports no appetite  - Voiding independently - yes  - Ambulating independently - no, up with stand by assist

## 2019-05-24 NOTE — PLAN OF CARE
"/57 (BP Location: Left arm)   Pulse 101   Temp 98.6  F (37  C) (Oral)   Resp 21   Ht 1.626 m (5' 4\")   Wt 103.1 kg (227 lb 4.8 oz)   LMP 05/10/2019   SpO2 91%   BMI 39.02 kg/m      Assumed care of pt at 1830.    VSS ex intermittent tachycardia in low 100s, mostly with movement. Pain controlled with PRN Tylenol and one dose of PRN oxycodone this shift. Up with SBA. Voids spont with adequate output. JT drain in place on R side of neck with moderate amount of bright red output. No BM since surgery. +flatus. L PIV infusing MIVF. R PIV SL.  at bedside supportive of cares. Resting comfortably between cares. Continue POC.     OBS GOALS     - Pain controlled: pt reports pain is well controlled. 1 dose PRN Tylenol and 1 dose PRN oxycodone given.   - Breathing on room air: 02 sats in mid-90s on 1L  - Tolerating a diet: pt states that she has no appetite.   - Voiding independently: yes, adequate amount  - Ambulating independently: up with SBA, reports mild dizziness upon standing, improved since first walk    "

## 2019-05-30 LAB — COPATH REPORT: NORMAL

## 2019-06-02 NOTE — OP NOTE
Procedure Date: 05/23/2019      PREOPERATIVE DIAGNOSIS:  Large right thyroid mass.         POSTOPERATIVE DIAGNOSIS:  Large right thyroid mass.         SURGICAL PROCEDURE PERFORMED:  Right thyroid lobectomy and isthmusectomy with 60 minutes of intraoperative recurrent laryngeal nerve monitoring.      SURGEON:  Frances Tadeo MD      CO-SURGEON: Bk Ventura MD       SECOND ASSISTANT:  Yovani Mccabe MD       ANESTHESIA:  General endotracheal with nerve monitoring endotracheal tube.      COMPLICATIONS:  None.      ESTIMATED BLOOD LOSS:  20 mL.      CLINICAL INDICATIONS FOR THE PROCEDURE:  The patient is a 49-year-old female with a long history of a large right thyroid mass.  The mass had become more symptomatic.  A CT scan confirmed a 10 cm right well-circumscribed right thyroid mass with significant tracheal deviation.  Based on this, it was recommended that the patient undergo a right thyroid lobectomy and isthmusectomy.  The surgical procedure was discussed with the patient, including but not limited to the risks of bleeding, infection, injury to the recurrent laryngeal nerve, possible loss of airway.  The patient is aware of this and agreed to proceed with surgery.  Consent was obtained, the site was marked.     Due to the complexity of this very large thyroid goiter and surgery-Dr. Bk Ventura as co-surgeon in this case.     DETAILS OF PROCEDURE:  The patient was brought to the operating room in stable condition, placed on the operating table in supine position.  After appropriate general anesthesia was obtained, the patient was prepped and draped in sterile fashion.  A timeout was then performed.  An 8 cm incision was made over the anterior neck following natural skin crease.  The platysma was then divided and subplatysmal planes were then created.  The anterior jugular veins were separately clamped, divided and then tied based on their location. As we entered into the neck and then created subplatysmal  planes, it was obvious that we would have to divide the patient's strap muscles to obtain adequate exposure.  The right strap muscles were separately divided using a LigaSure.  This permitted excellent exposure.  We first encountered a large left thyroid vein.  This was separately skeletonized, surgically tied, clipped and then divided using the LigaSure.  We carried our dissection cephalad to identify the right superior thyroidal vessels.  The anterior and posterior branches of each of the respective thyroid artery and vein superiorly were separately skeletonized, clipped and tied and then divided using a LigaSure.  This permitted us to mobilize the gland up and out of the operative bed medially to identify the right superior parathyroid gland.  This was dissected from the undersurface of the thyroid, maintaining its viability.  The right recurrent laryngeal nerve was identified at the level of the cricothyroid joint.  We followed this from a superior to inferior manner, dissecting the thyroid gland off it anteriorly.  The right inferior parathyroid gland was also identified.  We dissected this from the undersurface of the thyroid, maintaining its viability.  We followed the right recurrent laryngeal nerve as it entered into the neck and then separately skeletonized the right inferior thyroidal vessels.  These were clamped, tied as well as clipped and then divided using a LigaSure.  We continued rotating the gland from lateral to medial manner, dissecting it over the anterior portion of the trachea and then divided it just to the left of the isthmus using the LigaSure.  The specimen was now sent off for permanent pathologic evaluation.  The area was copiously irrigated.  Based on the dead space from the large previous thyroid mass.  We felt it necessary to place a drain in the operative bed.  A 15 round JT drain was placed in the operative bed, exiting inferolateral to the incision site just above the clavicle on  the right.  The drain was secured in place at the skin using a 3-0 nylon suture.  A Valsalva maneuver was performed at least twice, where no obvious bleeding was identified.  The recurrent laryngeal nerve was intact visibly and confirmed to be intact with nerve stimulation again.  Both right superior and right inferior parathyroid glands appeared to be intact and viable at the conclusion of the case.   Fibrillar and Surgicel were placed in the operative bed.  The strap muscles were then approximated on the right using a 3-0 Vicryl horizontal mattress suture.  The strap muscles were then approximated at the midline using a 3-0 chromic interrupted suture.  The platysma was approximated using a 3-0 chromic interrupted suture.  The skin incision was approximated with 5-0 Monocryl running subcuticular suture.  The wound was dressed with Dermabond.        The patient was then extubated and returned to the recovery room in stable condition.  I was present during the entire surgical procedure.         SULEIMAN LEUNG MD             D: 2019   T: 2019   MT: VIKY      Name:     PRAVEEN GOLDMAN   MRN:      [id number]        Account:        [id number]   :      1970           Procedure Date: 2019      Document: [id number]

## 2019-06-13 ENCOUNTER — OFFICE VISIT (OUTPATIENT)
Dept: SURGERY | Facility: CLINIC | Age: 49
End: 2019-06-13
Payer: COMMERCIAL

## 2019-06-13 VITALS
OXYGEN SATURATION: 94 % | SYSTOLIC BLOOD PRESSURE: 133 MMHG | BODY MASS INDEX: 39.27 KG/M2 | DIASTOLIC BLOOD PRESSURE: 86 MMHG | HEIGHT: 64 IN | WEIGHT: 230 LBS | HEART RATE: 92 BPM

## 2019-06-13 DIAGNOSIS — E89.0 S/P PARTIAL THYROIDECTOMY: Primary | ICD-10-CM

## 2019-06-13 PROCEDURE — 99024 POSTOP FOLLOW-UP VISIT: CPT | Performed by: SURGERY

## 2019-06-13 ASSESSMENT — MIFFLIN-ST. JEOR: SCORE: 1658.27

## 2019-06-13 ASSESSMENT — PAIN SCALES - GENERAL: PAINLEVEL: NO PAIN (0)

## 2019-06-13 NOTE — LETTER
6/13/2019         RE: Marcelo Galvez  [address] [place] [address]        Dear Colleague,    Thank you for referring your patient, Marcelo Galvez, to the [facility]. Please see a copy of my visit note below.    3 week post op visit s/p resection very large right lobe of thyroid.     Since the surgery patient denies any problem with voice quality, inspiration or swallowing. No sx of hypothyroidism. In fact she states she feels quite good since the large mass has been removed.    PE:  Wound is healing well. No evidence of hematoma, seroma or infection.     Pathology reviewed with patient    Plan:  Reviewed wound management and massage  Check TFT's in one month (TSH, T3 and T4)  Follow up with PCP    Again, thank you for allowing me to participate in the care of your patient.        Sincerely,        Frances Tadeo MD

## 2019-06-13 NOTE — NURSING NOTE
"Marcelo Galvez's goals for this visit include:   Chief Complaint   Patient presents with     RECHECK     post op       She requests these members of her care team be copied on today's visit information: yes    PCP: [facility]    Referring Provider:  Atilio Patel PA-C  [place] [id number] [address] [address]    /86   Pulse 92   Ht 1.626 m (5' 4\")   Wt 104.3 kg (230 lb)   SpO2 94%   BMI 39.48 kg/m      Do you need any medication refills at today's visit? No    Ok Kilpatrick CMA      "

## 2019-07-18 NOTE — PROGRESS NOTES
3 week post op visit s/p resection very large right lobe of thyroid.     Since the surgery patient denies any problem with voice quality, inspiration or swallowing. No sx of hypothyroidism. In fact she states she feels quite good since the large mass has been removed.    PE:  Wound is healing well. No evidence of hematoma, seroma or infection.     Pathology reviewed with patient    Plan:  Reviewed wound management and massage  Check TFT's in one month (TSH, T3 and T4)  Follow up with PCP

## 2019-07-22 ENCOUNTER — TELEPHONE (OUTPATIENT)
Dept: ENDOCRINOLOGY | Facility: CLINIC | Age: 49
End: 2019-07-22

## 2019-07-23 NOTE — TELEPHONE ENCOUNTER
Reason for call:  Other   Patient called regarding (reason for call): appointment  Additional comments: Patient called to cancel appointment with Endocrinology on 07/23/19. Patient states she already had her follow up appointment. Thanks!    Phone number to reach patient:  Cell number on file:    Telephone Information:   Mobile [phone]       Best Time:  anytime    Can we leave a detailed message on this number?  NO

## (undated) DEVICE — CLIP HORIZON SM RED WIDE SLOT 001201

## (undated) DEVICE — PREP PAD ALCOHOL 6818

## (undated) DEVICE — SU CHROMIC 3-0 SH 27" G122H

## (undated) DEVICE — SU SILK 3-0 SH CR 8X18" C013D

## (undated) DEVICE — Device

## (undated) DEVICE — SURGICEL FIBRILLAR HEMOSTAT 4"X4" 1963

## (undated) DEVICE — SOL NACL 0.9% IRRIG 1000ML BOTTLE 2F7124

## (undated) DEVICE — ESU PENCIL SMOKE EVAC W/ROCKER SWITCH 0703-047-000

## (undated) DEVICE — ESU GROUND PAD ADULT W/CORD E7507

## (undated) DEVICE — ADH SKIN CLOSURE PREMIERPRO EXOFIN 1.0ML 3470

## (undated) DEVICE — ESU ELEC BLADE 2.75" COATED/INSULATED E1455

## (undated) DEVICE — DRAIN JACKSON PRATT RESERVOIR 100ML SU130-1305

## (undated) DEVICE — SU SILK 2-0 TIE 12X30" A305H

## (undated) DEVICE — SPONGE KITTNER 30-101

## (undated) DEVICE — LINEN TOWEL PACK X5 5464

## (undated) DEVICE — SOL WATER IRRIG 1000ML BOTTLE 2F7114

## (undated) DEVICE — GLOVE PROTEXIS W/NEU-THERA 6.5  2D73TE65

## (undated) DEVICE — SURGICEL ABSORBABLE HEMOSTAT SNOW 4"X4" 2083

## (undated) DEVICE — SU VICRYL 3-0 SH 27" UND J416H

## (undated) DEVICE — NIM PROBE PRASS INCREMENTING TIP 8225825

## (undated) DEVICE — LIGASURE EXACT DISSECTOR

## (undated) DEVICE — LINEN TOWEL PACK X6 WHITE 5487

## (undated) DEVICE — SU MONOCRYL 5-0 P-3 18" UND Y493G

## (undated) DEVICE — SU SILK 3-0 TIE 12X30" A304H

## (undated) DEVICE — PACK NEURO MINOR UMMC SNE32MNMU4

## (undated) DEVICE — SU SILK 3-0 SH 30" K832H

## (undated) DEVICE — SU ETHILON 3-0 PS-1 18" 1663H

## (undated) DEVICE — PREP CHLORAPREP 26ML TINTED ORANGE  260815

## (undated) DEVICE — TABLESPOON METAL STERILE 17508/24

## (undated) DEVICE — CLIP HORIZON MED BLUE 002200

## (undated) DEVICE — ESU LIGASURE OPEN SEALER/DIVIDER SM JAW 16.5MM LF1212A

## (undated) DEVICE — SU SILK 4-0 TIE 12X30" A303H

## (undated) DEVICE — DRAIN JACKSON PRATT CHANNEL 15FR ROUND HUBLESS SIL JP-2228

## (undated) DEVICE — ADHESIVE SWIFTSET 0.8ML OCTYL SS6

## (undated) DEVICE — WIPE PREMOIST CLEANSING WASHCLOTHS 7988

## (undated) DEVICE — SUCTION SLEEVE NEPTUNE 2 125MM 0703-005-125

## (undated) DEVICE — ADH FLOSEAL W/HUMAN THROMBIN 5ML W/APPLICATOR TIP ADS201844

## (undated) DEVICE — DRSG TEGADERM 2 3/8X2 3/4" 1624W

## (undated) RX ORDER — ESMOLOL HYDROCHLORIDE 10 MG/ML
INJECTION INTRAVENOUS
Status: DISPENSED
Start: 2019-05-23

## (undated) RX ORDER — ATROPINE SULFATE 0.4 MG/ML
AMPUL (ML) INJECTION
Status: DISPENSED
Start: 2019-05-23

## (undated) RX ORDER — CEFAZOLIN SODIUM 1 G/3ML
INJECTION, POWDER, FOR SOLUTION INTRAMUSCULAR; INTRAVENOUS
Status: DISPENSED
Start: 2019-05-23

## (undated) RX ORDER — ONDANSETRON 2 MG/ML
INJECTION INTRAMUSCULAR; INTRAVENOUS
Status: DISPENSED
Start: 2019-05-23

## (undated) RX ORDER — DEXAMETHASONE SODIUM PHOSPHATE 4 MG/ML
INJECTION, SOLUTION INTRA-ARTICULAR; INTRALESIONAL; INTRAMUSCULAR; INTRAVENOUS; SOFT TISSUE
Status: DISPENSED
Start: 2019-05-23

## (undated) RX ORDER — FENTANYL CITRATE 50 UG/ML
INJECTION, SOLUTION INTRAMUSCULAR; INTRAVENOUS
Status: DISPENSED
Start: 2019-05-23

## (undated) RX ORDER — HYDROMORPHONE HYDROCHLORIDE 1 MG/ML
INJECTION, SOLUTION INTRAMUSCULAR; INTRAVENOUS; SUBCUTANEOUS
Status: DISPENSED
Start: 2019-05-23

## (undated) RX ORDER — FENTANYL CITRATE 50 UG/ML
INJECTION, SOLUTION INTRAMUSCULAR; INTRAVENOUS
Status: DISPENSED
Start: 2019-05-22

## (undated) RX ORDER — SODIUM CHLORIDE, SODIUM LACTATE, POTASSIUM CHLORIDE, CALCIUM CHLORIDE 600; 310; 30; 20 MG/100ML; MG/100ML; MG/100ML; MG/100ML
INJECTION, SOLUTION INTRAVENOUS
Status: DISPENSED
Start: 2019-05-23

## (undated) RX ORDER — BACITRACIN 500 [USP'U]/G
OINTMENT OPHTHALMIC
Status: DISPENSED
Start: 2019-05-23

## (undated) RX ORDER — PHENYLEPHRINE HCL IN 0.9% NACL 1 MG/10 ML
SYRINGE (ML) INTRAVENOUS
Status: DISPENSED
Start: 2019-05-23

## (undated) RX ORDER — GLYCOPYRROLATE 0.2 MG/ML
INJECTION, SOLUTION INTRAMUSCULAR; INTRAVENOUS
Status: DISPENSED
Start: 2019-05-23

## (undated) RX ORDER — EPHEDRINE SULFATE 50 MG/ML
INJECTION, SOLUTION INTRAMUSCULAR; INTRAVENOUS; SUBCUTANEOUS
Status: DISPENSED
Start: 2019-05-23

## (undated) RX ORDER — PROPOFOL 10 MG/ML
INJECTION, EMULSION INTRAVENOUS
Status: DISPENSED
Start: 2019-05-23